# Patient Record
Sex: MALE | Employment: OTHER | URBAN - METROPOLITAN AREA
[De-identification: names, ages, dates, MRNs, and addresses within clinical notes are randomized per-mention and may not be internally consistent; named-entity substitution may affect disease eponyms.]

---

## 2017-03-15 ENCOUNTER — HOSPITAL ENCOUNTER (EMERGENCY)
Facility: CLINIC | Age: 72
Discharge: HOME | End: 2017-03-16
Attending: NURSE PRACTITIONER | Admitting: NURSE PRACTITIONER
Payer: MEDICARE

## 2017-03-15 ENCOUNTER — APPOINTMENT (OUTPATIENT)
Dept: ULTRASOUND IMAGING | Facility: CLINIC | Age: 72
End: 2017-03-15
Attending: EMERGENCY MEDICINE
Payer: MEDICARE

## 2017-03-15 DIAGNOSIS — M79.661 PAIN OF RIGHT LOWER LEG: ICD-10-CM

## 2017-03-15 DIAGNOSIS — T14.8XXA MUSCLE STRAIN: ICD-10-CM

## 2017-03-15 PROCEDURE — 93971 EXTREMITY STUDY: CPT | Mod: RT

## 2017-03-15 PROCEDURE — 99284 EMERGENCY DEPT VISIT MOD MDM: CPT | Mod: 25

## 2017-03-15 ASSESSMENT — ENCOUNTER SYMPTOMS
FEVER: 0
SHORTNESS OF BREATH: 0

## 2017-03-15 NOTE — ED AVS SNAPSHOT
Emergency Department    [address] [place] [address]    Phone:  [phone]    Fax:  [phone]                                       Juan Alberto Schultz   MRN: [id number]    Department:   Emergency Department   Date of Visit:  3/15/2017           After Visit Summary Signature Page     I have received my discharge instructions, and my questions have been answered. I have discussed any challenges I see with this plan with the nurse or doctor.    ..........................................................................................................................................  Patient/Patient Representative Signature      ..........................................................................................................................................  Patient Representative Print Name and Relationship to Patient    ..................................................               ................................................  Date                                            Time    ..........................................................................................................................................  Reviewed by Signature/Title    ...................................................              ..............................................  Date                                                            Time

## 2017-03-15 NOTE — ED AVS SNAPSHOT
Emergency Department    [address] [facility] [address]    Phone:  [phone]    Fax:  [phone]                                       Juan Alberto Schultz   MRN: [id number]    Department:   Emergency Department   Date of Visit:  3/15/2017           Patient Information     Date Of Birth          1945        Your diagnoses for this visit were:     Pain of right lower leg     Muscle strain        You were seen by Veronique Luis APRN CNP.      Follow-up Information     Follow up with Reji Rain MD In 5 days.    Specialty:  Family Practice    Contact information:    [facility]  [address] [facility] [address]  [facility] [address]  [phone]          Discharge Instructions         Muscle Strain in the Extremities  A muscle strain is a stretching and tearing of muscle fibers. This causes pain, especially when you move that muscle. There may also be some swelling and bruising.  Home care    Keep the hurt area raised to reduce pain and swelling. This is especially important during the first 48 hours.    Apply an ice pack over the injured area for 15 to 20 minutes every 3 to 6 hours. You should do this for the first 24 to 48 hours. You can make an ice pack by filling a plastic bag that seals at the top with ice cubes and then wrapping it with a thin towel. Be careful not to injure your skin with the ice treatments. Ice should never be applied directly to skin. Continue the use of ice packs for relief of pain and swelling as needed. After 48 hours, apply heat (warm shower or warm bath) for 15 to 20 minutes several times a day, or alternate ice and heat.    You may use over-the-counter pain medicine to control pain, unless another medicine was prescribed. If you have chronic liver or kidney disease or ever had a stomach ulcer or GI bleeding, talk with your healthcare provider before using these medicines.    For leg strains: If crutches have been recommended, don t put full  weight on the hurt leg until you can do so without pain. You can return to sports when you are able to hop and run on the injured leg without pain.  Follow-up care  Follow up with your healthcare provider, or as advised.  When to seek medical advice  Call your healthcare provider right away if any of these occur:    The toes of the injured leg become swollen, cold, blue, numb, or tingly    Pain or swelling increases    9384-8848 [employer]. [address] [place] [address], [place] [address]. All rights reserved. This information is not intended as a substitute for professional medical care. Always follow your healthcare professional's instructions.          24 Hour Appointment Hotline       To make an appointment at any [facility], call [phone] ([phone]). If you don't have a family doctor or clinic, we will help you find one. [place] clinics are conveniently located to serve the needs of you and your family.             Review of your medicines      Notice     You have not been prescribed any medications.            Procedures and tests performed during your visit     US Lower Extremity Venous Duplex Right      Orders Needing Specimen Collection     None      Pending Results     No orders found for last 3 day(s).            Pending Culture Results     No orders found for last 3 day(s).             Test Results from your hospital stay     3/15/2017 11:06 PM - Interface, Radiant Ib      Narrative     VENOUS DOPPLER RIGHT LOWER EXTREMITY 3/15/2017 10:56 PM    HISTORY: Right lower extremity pain.    COMPARISON: None.    FINDINGS: Color-flow imaging and Doppler waveform spectral analysis  were utilized. There is normal compressibility and spontaneous flow  throughout the right common femoral, superficial femoral, popliteal  and visualized calf veins.        Impression     IMPRESSION: No evidence for deep venous thrombosis.    ZEE MÉNDEZ MD                Clinical Quality Measure: Blood  Pressure Screening     Your blood pressure was checked while you were in the emergency department today. The last reading we obtained was  BP: (!) 176/97 . Please read the guidelines below about what these numbers mean and what you should do about them.  If your systolic blood pressure (the top number) is less than 120 and your diastolic blood pressure (the bottom number) is less than 80, then your blood pressure is normal. There is nothing more that you need to do about it.  If your systolic blood pressure (the top number) is 120-139 or your diastolic blood pressure (the bottom number) is 80-89, your blood pressure may be higher than it should be. You should have your blood pressure rechecked within a year by a primary care provider.  If your systolic blood pressure (the top number) is 140 or greater or your diastolic blood pressure (the bottom number) is 90 or greater, you may have high blood pressure. High blood pressure is treatable, but if left untreated over time it can put you at risk for heart attack, stroke, or kidney failure. You should have your blood pressure rechecked by a primary care provider within the next 4 weeks.  If your provider in the emergency department today gave you specific instructions to follow-up with your doctor or provider even sooner than that, you should follow that instruction and not wait for up to 4 weeks for your follow-up visit.        Thank you for choosing [facility]       Thank you for choosing [facility] for your care. Our goal is always to provide you with excellent care. Hearing back from our patients is one way we can continue to improve our services. Please take a few minutes to complete the written survey that you may receive in the mail after you visit with us. Thank you!        [employer] Information     [employer] lets you send messages to your doctor, view your test results, renew your prescriptions, schedule appointments and more. To sign up, go to [email or web address]  [email or web address] Click on \"Log in\" on the left side of the screen, which will take you to the Welcome page. Then click on \"Sign up Now\" on the right side of the page.     You will be asked to enter the access code listed below, as well as some personal information. Please follow the directions to create your username and password.     Your access code is: [email or web address]  Expires: 2017 11:51 PM     Your access code will  in 90 days. If you need help or a new code, please call your [facility] clinic or [phone].        Care EveryWhere ID     This is your Care EveryWhere ID. This could be used by other organizations to access your [facility] medical records  [id number]        After Visit Summary       This is your record. Keep this with you and show to your community pharmacist(s) and doctor(s) at your next visit.                  "

## 2017-03-16 VITALS
DIASTOLIC BLOOD PRESSURE: 93 MMHG | SYSTOLIC BLOOD PRESSURE: 155 MMHG | HEIGHT: 68 IN | TEMPERATURE: 98.5 F | BODY MASS INDEX: 31.83 KG/M2 | OXYGEN SATURATION: 94 % | WEIGHT: 210 LBS | RESPIRATION RATE: 16 BRPM

## 2017-03-16 NOTE — ED PROVIDER NOTES
"  History     Chief Complaint:  Leg Pain     HPI   Juan Alberto Schultz is a 72 year old male who presents to the emergency department today with his wife for evaluation of right leg pain. The patient reports that he has been having right calf pain since 0700 this morning and this pain is worse with range of motion. The patient initially thought that this was a jacek horse but then his pain did not go away and was getting progressively worse so he came here to the emergency department. He states that his body was \"not letting him walk the way he wanted to walk.\" The patient denies any chest pain, shortness of breath, fever, leg swelling, or trauma to the right leg. The patient states that he has a history of circulation problems. He currently reports that he doesn't feel steady when he is standing; \"like he cannot trust himself.\" The patient's primary care physician is Dr. Reji Rain. The patient states that his brother had a pulmonary embolism several years ago. The patient also notes that he was doing some heavy lifting yesterday.     Allergies:  Antihistamines, Chlorpheniramine-Type [Alkylamines]     Medications:    No current outpatient prescriptions on file.    Past Medical History:    Allergic state  Migraines  Umbilical hernia     Past Surgical History:    Orthopedic surgery     Family History:    Brother: Pulmonary embolism     Social History:  The patient was accompanied to the ED by his wife.  Smoking Status: Never Smoker  Smokeless Tobacco: Never Used  Alcohol Use: Negative   Marital Status:   [2]     Review of Systems   Constitutional: Negative for fever.   Respiratory: Negative for shortness of breath.    Cardiovascular: Negative for chest pain and leg swelling.   Musculoskeletal:        Right leg pain   All other systems reviewed and are negative.    Physical Exam   Vitals:  Patient Vitals for the past 24 hrs:   BP Temp Temp src Heart Rate Resp SpO2 Height Weight   03/15/17 2133 (!) 176/97 98.5 " " F (36.9  C) Temporal 77 16 97 % 1.727 m (5' 8\") 95.3 kg (210 lb)     Physical Exam  Physical Exam   Constitutional: Sitting in bed comfortably. Non toxic appearing.   Head: Head moves freely with normal range of motion.   ENT: Oropharynx is clear and moist.   Eyes: Conjunctivae pink. EOMs intact.   Neck: Normal range of motion.   Cardiovascular: Regular rate and rhythm. Normal heart sounds. No concerning murmur. Intact distal pulses: pedal pulses 2+ on the right, 2+ on the left.   Pulmonary/Chest: No respiratory distress. No decreased breath sounds. No wheezes. No rhonchi. No rales. Lungs clear throughout.   Abdominal: Soft. Non-tender. No rebound, no guarding.   Musculoskeletal: Right lower extremity with obvious varicose veins, especially at the inner thigh/inner calf and around the medial malleolus. No mass or pulsatile mass behind the knee. No generalized edema of the right leg. No erythema or heat to touch. Distal capillary refill and sensation intact.   Neurological: Oriented to person, place, and time. No focal deficits.   Skin: Skin is warm and normal in color. No rash noted.      Emergency Department Course     Imaging:  Radiology findings were communicated with the patient who voiced understanding of the findings.    US Lower Extremity Venous Duplex Right  No evidence for deep venous thrombosis.  ZEE MÉNDEZ MD  Reading per radiology    Emergency Department Course:  Nursing notes and vitals reviewed.  I performed an exam of the patient as documented above.   The patient was sent for a US Lower Extremity Venous Duplex Right while in the emergency department, results above.   I discussed the treatment plan with the patient. They expressed understanding of this plan and consented to discharge. They will be discharged home with instructions for care and follow up. In addition, the patient will return to the emergency department if their symptoms persist, worsen, if new symptoms arise or if there is any " concern.  All questions were answered.  I personally reviewed the imaging results with the patient and answered all related questions prior to discharge.  Impression & Plan      Medical Decision Making:  Pt with right calf pain and cramping. No injury or trauma and initially he denied and new activities but upon discharge he spoke about lifting 100 pound objects yesterday. Right leg with large varicose veins. No concerns on exam for Bakers cyst, popliteal aneurysm, cellulitis, septic joint, myositis and U/S with no DVT. I suspect a muscle strain versus symptomatic varicose veins. WE discussed need for follow up and reasons to return here. Pt amenable to plan.     Diagnosis:    ICD-10-CM    1. Pain of right lower leg M79.661    2. Muscle strain T14.8      Disposition:   The patient is discharged to home.    Scribe Disclosure:  I, Abdon Vale, am serving as a scribe at 11:07 PM on 3/15/2017 to document services personally performed by Veronique Luis, based on my observations and the provider's statements to me.    3/15/2017    EMERGENCY DEPARTMENT       Veronique Luis, APRN CNP  03/16/17 0153

## 2017-03-16 NOTE — ED NOTES
Pt states right leg pain started  About 0700 this morning.  Pt  Has had vascular problems in that leg

## 2017-03-16 NOTE — DISCHARGE INSTRUCTIONS
Muscle Strain in the Extremities  A muscle strain is a stretching and tearing of muscle fibers. This causes pain, especially when you move that muscle. There may also be some swelling and bruising.  Home care    Keep the hurt area raised to reduce pain and swelling. This is especially important during the first 48 hours.    Apply an ice pack over the injured area for 15 to 20 minutes every 3 to 6 hours. You should do this for the first 24 to 48 hours. You can make an ice pack by filling a plastic bag that seals at the top with ice cubes and then wrapping it with a thin towel. Be careful not to injure your skin with the ice treatments. Ice should never be applied directly to skin. Continue the use of ice packs for relief of pain and swelling as needed. After 48 hours, apply heat (warm shower or warm bath) for 15 to 20 minutes several times a day, or alternate ice and heat.    You may use over-the-counter pain medicine to control pain, unless another medicine was prescribed. If you have chronic liver or kidney disease or ever had a stomach ulcer or GI bleeding, talk with your healthcare provider before using these medicines.    For leg strains: If crutches have been recommended, don t put full weight on the hurt leg until you can do so without pain. You can return to sports when you are able to hop and run on the injured leg without pain.  Follow-up care  Follow up with your healthcare provider, or as advised.  When to seek medical advice  Call your healthcare provider right away if any of these occur:    The toes of the injured leg become swollen, cold, blue, numb, or tingly    Pain or swelling increases    [id number] [employer]. [address] [place] [address], [place] [address]. All rights reserved. This information is not intended as a substitute for professional medical care. Always follow your healthcare professional's instructions.

## 2018-10-03 ENCOUNTER — APPOINTMENT (OUTPATIENT)
Dept: CT IMAGING | Facility: CLINIC | Age: 73
End: 2018-10-03
Attending: EMERGENCY MEDICINE
Payer: MEDICARE

## 2018-10-03 ENCOUNTER — HOSPITAL ENCOUNTER (EMERGENCY)
Facility: CLINIC | Age: 73
Discharge: HOME | End: 2018-10-03
Attending: EMERGENCY MEDICINE | Admitting: EMERGENCY MEDICINE
Payer: MEDICARE

## 2018-10-03 ENCOUNTER — APPOINTMENT (OUTPATIENT)
Dept: MRI IMAGING | Facility: CLINIC | Age: 73
End: 2018-10-03
Attending: EMERGENCY MEDICINE
Payer: MEDICARE

## 2018-10-03 VITALS
BODY MASS INDEX: 30.06 KG/M2 | WEIGHT: 210 LBS | OXYGEN SATURATION: 96 % | SYSTOLIC BLOOD PRESSURE: 138 MMHG | DIASTOLIC BLOOD PRESSURE: 84 MMHG | RESPIRATION RATE: 10 BRPM | TEMPERATURE: 98.2 F | HEIGHT: 70 IN

## 2018-10-03 DIAGNOSIS — R29.90 STROKE-LIKE SYMPTOMS: ICD-10-CM

## 2018-10-03 DIAGNOSIS — I10 ESSENTIAL HYPERTENSION: ICD-10-CM

## 2018-10-03 LAB
ANION GAP SERPL CALCULATED.3IONS-SCNC: 6 MMOL/L (ref 3–14)
APTT PPP: 33 SEC (ref 22–37)
BASOPHILS # BLD AUTO: 0 10E9/L (ref 0–0.2)
BASOPHILS NFR BLD AUTO: 0.4 %
BUN SERPL-MCNC: 16 MG/DL (ref 7–30)
CALCIUM SERPL-MCNC: 8.9 MG/DL (ref 8.5–10.1)
CHLORIDE SERPL-SCNC: 109 MMOL/L (ref 94–109)
CO2 SERPL-SCNC: 26 MMOL/L (ref 20–32)
CREAT SERPL-MCNC: 1.03 MG/DL (ref 0.66–1.25)
DIFFERENTIAL METHOD BLD: NORMAL
EOSINOPHIL # BLD AUTO: 0.1 10E9/L (ref 0–0.7)
EOSINOPHIL NFR BLD AUTO: 1.3 %
ERYTHROCYTE [DISTWIDTH] IN BLOOD BY AUTOMATED COUNT: 13.8 % (ref 10–15)
GFR SERPL CREATININE-BSD FRML MDRD: 71 ML/MIN/1.7M2
GLUCOSE SERPL-MCNC: 119 MG/DL (ref 70–99)
HCT VFR BLD AUTO: 45.3 % (ref 40–53)
HGB BLD-MCNC: 15.5 G/DL (ref 13.3–17.7)
IMM GRANULOCYTES # BLD: 0 10E9/L (ref 0–0.4)
IMM GRANULOCYTES NFR BLD: 0.2 %
INR PPP: 1.01 (ref 0.86–1.14)
INTERPRETATION ECG - MUSE: NORMAL
LYMPHOCYTES # BLD AUTO: 1.5 10E9/L (ref 0.8–5.3)
LYMPHOCYTES NFR BLD AUTO: 26.8 %
MCH RBC QN AUTO: 31.1 PG (ref 26.5–33)
MCHC RBC AUTO-ENTMCNC: 34.2 G/DL (ref 31.5–36.5)
MCV RBC AUTO: 91 FL (ref 78–100)
MONOCYTES # BLD AUTO: 0.8 10E9/L (ref 0–1.3)
MONOCYTES NFR BLD AUTO: 13.7 %
NEUTROPHILS # BLD AUTO: 3.2 10E9/L (ref 1.6–8.3)
NEUTROPHILS NFR BLD AUTO: 57.6 %
NRBC # BLD AUTO: 0 10*3/UL
NRBC BLD AUTO-RTO: 0 /100
PLATELET # BLD AUTO: 176 10E9/L (ref 150–450)
POTASSIUM SERPL-SCNC: 4.2 MMOL/L (ref 3.4–5.3)
RBC # BLD AUTO: 4.99 10E12/L (ref 4.4–5.9)
SODIUM SERPL-SCNC: 141 MMOL/L (ref 133–144)
WBC # BLD AUTO: 5.5 10E9/L (ref 4–11)

## 2018-10-03 PROCEDURE — 25000125 ZZHC RX 250: Performed by: EMERGENCY MEDICINE

## 2018-10-03 PROCEDURE — 85730 THROMBOPLASTIN TIME PARTIAL: CPT | Performed by: EMERGENCY MEDICINE

## 2018-10-03 PROCEDURE — 70553 MRI BRAIN STEM W/O & W/DYE: CPT

## 2018-10-03 PROCEDURE — 25500064 ZZH RX 255 OP 636: Performed by: EMERGENCY MEDICINE

## 2018-10-03 PROCEDURE — 93005 ELECTROCARDIOGRAM TRACING: CPT

## 2018-10-03 PROCEDURE — A9585 GADOBUTROL INJECTION: HCPCS | Performed by: EMERGENCY MEDICINE

## 2018-10-03 PROCEDURE — 85610 PROTHROMBIN TIME: CPT | Performed by: EMERGENCY MEDICINE

## 2018-10-03 PROCEDURE — 85025 COMPLETE CBC W/AUTO DIFF WBC: CPT | Performed by: EMERGENCY MEDICINE

## 2018-10-03 PROCEDURE — 99285 EMERGENCY DEPT VISIT HI MDM: CPT | Mod: 25

## 2018-10-03 PROCEDURE — 80048 BASIC METABOLIC PNL TOTAL CA: CPT | Performed by: EMERGENCY MEDICINE

## 2018-10-03 PROCEDURE — 70496 CT ANGIOGRAPHY HEAD: CPT | Mod: XS

## 2018-10-03 PROCEDURE — 70496 CT ANGIOGRAPHY HEAD: CPT

## 2018-10-03 PROCEDURE — 70450 CT HEAD/BRAIN W/O DYE: CPT

## 2018-10-03 PROCEDURE — 25000128 H RX IP 250 OP 636: Performed by: EMERGENCY MEDICINE

## 2018-10-03 PROCEDURE — 99214 OFFICE O/P EST MOD 30 MIN: CPT | Performed by: PSYCHIATRY & NEUROLOGY

## 2018-10-03 RX ORDER — IOPAMIDOL 755 MG/ML
125 INJECTION, SOLUTION INTRAVASCULAR ONCE
Status: COMPLETED | OUTPATIENT
Start: 2018-10-03 | End: 2018-10-03

## 2018-10-03 RX ORDER — GADOBUTROL 604.72 MG/ML
9 INJECTION INTRAVENOUS ONCE
Status: COMPLETED | OUTPATIENT
Start: 2018-10-03 | End: 2018-10-03

## 2018-10-03 RX ADMIN — GADOBUTROL 9 ML: 604.72 INJECTION INTRAVENOUS at 15:53

## 2018-10-03 RX ADMIN — IOPAMIDOL 125 ML: 755 INJECTION, SOLUTION INTRAVENOUS at 14:19

## 2018-10-03 RX ADMIN — SODIUM CHLORIDE, PRESERVATIVE FREE 100 ML: 5 INJECTION INTRAVENOUS at 14:19

## 2018-10-03 ASSESSMENT — ENCOUNTER SYMPTOMS
NUMBNESS: 1
DIAPHORESIS: 1
EYE DISCHARGE: 1
WEAKNESS: 1
SPEECH DIFFICULTY: 1

## 2018-10-03 NOTE — ED PROVIDER NOTES
"  History     Chief Complaint:  One-sided Weakness     The history is provided by the patient.      Juan Alberot Schultz is a 73 year old male with an unknown history, who presents with his wife for evaluation of left sided weakness. About 12 minutes prior to arrival, the patient reports that he was driving home after visiting his father-in-law's cemetary with family, when he began to feel \"shaky and unsure of himself.\" He refused to drive at this time due to this, and a few minutes later began to tear up in his left eye and develop left sided numbness radiating from his left hand into his left armpit. In addition to this the patient endorses slurred speech, trouble finding words and voice changes which he describes as sounding \"choked\" up, although he notes that he was not particularly emotional at this time. Since onset, these symptoms have been intermittent. He presents hypertensive at 102/108 in the ED with a HR of 85 and stats at 97%. The patient additionally endorses diaphoresis from his scalp as well as a sense of being off balance, yet he states this could be due to a current knee injury. No other symptoms at this time.     Allergies:  Antihistamines, Chlorpheniramine-Type [Alkylamines]  Valium [Diazepam]    Medications:    The patient is not currently taking any prescribed medications.    Past Medical History:    Migraines    Past Surgical History:    Orthopedic surgery    Family History:    History reviewed. No pertinent family history.     Social History:  The patient was accompanied to the ED by his wife.  Smoking Status: Never  Smokeless Tobacco: Never  Alcohol Use: No  Marital Status:       Review of Systems   Constitutional: Positive for diaphoresis.   Eyes: Positive for discharge.   Neurological: Positive for speech difficulty, weakness and numbness.   All other systems reviewed and are negative.    Physical Exam     Physical Exam    Patient Vitals for the past 24 hrs:   BP Temp Temp src Heart Rate " "Resp SpO2 Height Weight   10/03/18 1557 - - - 79 10 96 % - -   10/03/18 1556 138/84 - - - - - - -   10/03/18 1500 128/83 - - 72 22 92 % - -   10/03/18 1445 137/88 - - 83 14 - - -   10/03/18 1433 146/88 - - 81 20 95 % - -   10/03/18 1426 - - - 81 13 100 % - -   10/03/18 1425 (!) 154/97 - - 85 13 - - -   10/03/18 1420 144/85 - - - - - - -   10/03/18 1415 153/90 - - 85 16 93 % - -   10/03/18 1354 (!) 183/99 98.2  F (36.8  C) Oral 91 18 95 % 1.765 m (5' 9.5\") 95.3 kg (210 lb)     General: Alert and Interactive.   Head: No signs of trauma.   Mouth/Throat: Oropharynx is clear and moist.   Eyes: Conjunctivae are normal. Pupils are equal, round, and reactive to light.   Neck: Normal range of motion. No nuchal rigidity.   CV: Normal rate and regular rhythm.    Resp: Effort normal and breath sounds normal. No respiratory distress.   GI: Soft. There is no tenderness or guarding.   MSK: Normal range of motion. no edema.   Neuro Exam:  PERRLA, EOMI, visual fields intact  strength in upper/lower extremities normal and symetrical.  No drift.  DTR's normal.  Sensation normal.  Finger nose finger, rapid alternating movements normal   Rhomberg negative  Speech normal  Gait normal  GCS 15  Skin: Skin is warm and dry. No rash noted.   Psych: normal mood and affect. behavior is normal.     Emergency Department Course     ECG (14:05:03):  Rate 84 bpm. AZ interval 164. QRS duration 90. QT/QTc 352/415. P-R-T axes 43 34 34. Normal sinus rhythm. Possible left artrial enlargement. Borderline ECG. Interpreted at 1406 by Germán James MD.    Imaging:  Radiology findings were communicated with the patient who voiced understanding of the findings.    CT Head Perfusion w Contrast:  Normal CT perfusion of the brain.      Radiation dose for this scan was reduced using automated exposure  control, adjustment of the mA and/or kV according to patient size, or  iterative reconstruction technique.    CHUCK ALARCON MD    CT Head w/o " Contrast:  Diffuse cerebral volume loss and cerebral white matter  changes consistent with chronic small vessel ischemic disease. No  evidence for acute intracranial pathology.        Radiation dose for this scan was reduced using automated exposure  control, adjustment of the mA and/or kV according to patient size, or  iterative reconstruction technique.    CHUCK ALARCON MD    CTA Head and Neck w Contrast:  Calcified atherosclerotic plaque of the intracranial  distal internal carotid arteries on both sides that does not result in  stenosis on either side. Otherwise, normal neck and head CTA.      Radiation dose for this scan was reduced using automated exposure  control, adjustment of the mA and/or kV according to patient size, or  iterative reconstruction technique.    CHUCK ALARCON MD    MR Brain w & w/o Contrast:  No evidence of acute ischemia or hemorrhage. Volume loss  and white matter T2 hyperintensities which likely represent chronic  small vessel ischemic change.    JAMI VALDEZ MD    Laboratory:  Laboratory findings were communicated with the patient who voiced understanding of the findings.    CBC: WNL (WBC 5.5, HGB 15.5, )  BMP: Glucose 119 (H), o/w WNL (Creatinine 1.03)    INR: 1.01  Partial thromboplastin: 33    Interventions:  1419 - Isovue 125 mL IV  1553 - Gadavist injection 9 mL IV    Emergency Department Course:  Nursing notes and vitals reviewed.    1400: I performed an exam of the patient as documented above.   1406: Code stroke activated  1407: Initial exam completed, I exited the room at this time.     Findings and plan explained to the Patient. Patient discharged home with instructions regarding supportive care, medications, and reasons to return. The importance of close follow-up was reviewed.     Impression & Plan      Medical Decision Making:  Juan Alberto Schultz is a 73 year old male who presents for evaluation of stroke like symptoms.      This is consistent with possible transient  ischemic attack.     Detailed neurologic exam here in ED shows complete resolution of symptoms.  Imaging as noted above.  Based on symptom resolution and NIH stroke scale, they are not a candidate for TPA, neurology team agrees.     I did discuss this with the patient and his wife. hey express understanding. Differential considered for symptoms included CVA, TIA, dissection, cerebral tumor, migraine, infection, metabolic derangement, demyelination, etc.  EKG shows no atrial fibrillation nor arrhythmia noted on telemetry monitoring.  Neuro recommends outpatient followup now that his symptoms have resolved and imaging is negative.        Diagnosis:    ICD-10-CM    1. Stroke-like symptoms R29.90    2. Essential hypertension I10        Disposition:  discharged to home    Ama Kellogg  10/3/2018    EMERGENCY DEPARTMENT  I, Ama Kellogg, am serving as a scribe at 2:00 PM on 10/3/2018 to document services personally performed by Germán James MD based on my observations and the provider's statements to me.         Germán James MD  10/03/18 1737

## 2018-10-03 NOTE — CONSULTS
"[facility]     Neurology Stroke Consult    Patient Name: Juan Alberto Schultz  : 1945   MRN#: [id number]    STROKE DATA  Stroke Code:  Time called:  10/03/2018 1406  Time patient seen:  10/03/2018 1410  Onset of symptoms:  10/03/2018 1340  Last known normal (pt's baseline):  10/03/2018 1340  Head CT read by Dr. Montoya at:  10/03/2018 1416  Stroke Code de-escalated at 10/03/2018 1430 after discussion with Dr. LYNDA Montoya due to resolution of symptoms.   TPA treatment:  Not given due to minor / isolated / quickly resolving stroke symptoms.   National Institutes of Health Stroke Scale (at presentation)  NIHSS done at:  time patient seen     Score    Level of consciousness:  (0)   Alert, keenly responsive     LOC questions:  (0)   Answers both questions correctly    LOC commands:  (0)   Performs both tasks correctly    Best gaze:  (0)   Normal    Visual:  (0)   No visual loss    Facial palsy:  (0)   Normal symmetrical movements    Motor arm (left):  (0)   No drift    Motor arm (right):  (0)   No drift    Motor leg (left):  (0)   No drift    Motor leg (right):  (0)   No drift    Limb ataxia:  (0)   Absent    Sensory:  (0)   Normal- no sensory loss    Best language:  (0)   Normal- no aphasia    Dysarthria:  (0)   Normal    Extinction and inattention:  (0)   No abnormality        NIHSS Total Score:  0        Chief complaint  Left arm numbness    HPI  Juan Alberto Schultz is a 73 year old right handed male with Asperger's who presented with numbness that began in his left hand, extended throughout arm and to the left side of his face today. He was returning home from his father-in-law's  when he began to feel \"off\" and asked his wife to drive. He felt that his left hand was numb and tingling, first in his 4th and 5th fingers, then spreading up his arm. This occurred within 30 minutes of presentation. His face also felt numb and his wife reports he was mumbling, but mostly making sense. He may have had some " weakness in his left hand. On arrival to the ED his blood pressure was near 200 systolic, but came down to 140/90. He denies history of hypertension but does not go to the doctor. After CT, he reports that numbness/tingling are limited to his 5th finger, ulnar side of left hand.     Pertinent Past Medical/Surgical History  Past Medical History:   Diagnosis Date     Allergic state      Migraines      Umbilical hernia      Past Surgical History:   Procedure Laterality Date     ORTHOPEDIC SURGERY     Medications: I have reviewed this patient's current medications.  No daily medications  Allergies: All allergies reviewed and addressed.  Family History: No family history on file..  Social History: I have reviewed this patient's social history. Lives with his wife. 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  Tobacco use: Never    ROS:  The 10 point Review of Systems is negative other than noted in the HPI or here.     PHYSICAL EXAMINATION  Vital Signs:  B/P: 128/83,  T: 98.2,  P: Data Unavailable,  R: 22  General:  patient lying in bed without any acute distress    HEENT:  normocephalic/atraumatic  Cardio:  RRR  Pulmonary:  no respiratory distress  Abdomen:  non-distended  Extremities:  no edema  Skin:  intact   Neurologic  Mental Status:  fully alert, attentive and oriented, follows commands, speech clear and fluent  Cranial Nerves:  visual fields intact, PERRL, EOMI with normal smooth pursuit although corrective saccades, facial sensation intact and symmetric, facial movements symmetric, hearing not formally tested but intact to conversation, palate elevation symmetric and uvula midline, no dysarthria, tongue protrusion midline  Motor:  no abnormal movements, normal tone throughout, normal muscle bulk, no pronator drift, strength 5/5 throughout upper and lower extremities  Reflexes:  not tested  Sensory:  intact/symmetric to light touch and pin prick throughout upper and lower  extremities  Coordination:  FNF and HS intact without dysmetria  Station/Gait:  not tested    Labs  Labs and Imaging reviewed and used in developing the plan; pertinent results included.   Lab Results   Component Value Date     (H) 10/03/2018   PTT 33, INR 1.01  Hgb 15.5, WBC 5.5, Plt 176  BMP wnl    Imaging     Cta Head Neck With Contrast - Calcified atherosclerotic plaque of the intracranial distal internal carotid arteries on both sides that does not result in stenosis on either side. Otherwise, normal neck and head CTA.    Ct Head W/o Contrast - Diffuse cerebral volume loss and cerebral white matter changes consistent with chronic small vessel ischemic disease. No evidence for acute intracranial pathology.     Ct Head Perfusion W Contrast - Normal CT perfusion of the brain.     MRI - No evidence of acute ischemia or hemorrhage. Volume loss and white matter T2 hyperintensities which likely represent chronic small vessel ischemic change.    Dysphagia Screen  Not performed by the Stroke team     ASSESSMENT & RECOMMENDATIONS     Impression:   The patient was seen for left arm, face numbness.  The differential diagnosis includes hypertensive encephalopathy. Less likely to be TIA. With near complete resolution of symptoms and no evidence of acute ischemia on CT, CT perfusion, CTA, MRI discussed that his symptoms are not likely due to TIA or stroke and TPA is not indicated.     Recommendations:  - Ok to discharge  - Close follow up with PCP to monitor/control blood pressure   - Will need further secondary prevention, counseled regarding close follow-up with his PCP  - Counseled patient on presenting to the ER if he develops any acute strokelike symptoms    The patient was discussed with Dr. Montoya.    Scribed by:  Marsha Foote, MS4    Lisa López MD  Vascular Neurology fellow  Pager # [phone]

## 2018-10-03 NOTE — ED AVS SNAPSHOT
Emergency Department    [address] [place] [address]    Phone:  [phone]    Fax:  [phone]                                       Juan Alberto Schultz   MRN: [id number]    Department:   Emergency Department   Date of Visit:  10/3/2018           After Visit Summary Signature Page     I have received my discharge instructions, and my questions have been answered. I have discussed any challenges I see with this plan with the nurse or doctor.    ..........................................................................................................................................  Patient/Patient Representative Signature      ..........................................................................................................................................  Patient Representative Print Name and Relationship to Patient    ..................................................               ................................................  Date                                   Time    ..........................................................................................................................................  Reviewed by Signature/Title    ...................................................              ..............................................  Date                                               Time          22EPIC Rev 08/18

## 2018-10-03 NOTE — DISCHARGE INSTRUCTIONS
What Is a TIA?  A TIA (transient ischemic attack) is an early warning that a stroke (also called a brain attack) is coming. A TIA is a temporary stroke. It causes no lasting damage. But the effects of a stroke, if it happens, can be very serious and lasting. If you think you are having symptoms of a TIA or stroke--even if they don t last--get medical help right away.     If you have any symptoms of a TIA or stroke, call 911 and your doctor as soon as possible.     Symptoms of TIA and stroke  Symptoms may come on suddenly and last for a few seconds or a few hours. You may have symptoms only once. Or they may come and go for days. If you notice any of the following symptoms, don t wait. Call 911 or emergency services right away.    Weakness, numbness, tingling, or loss of feeling in your face, arm, or leg    Trouble seeing in one or both eyes; double vision    Slurred speech, trouble talking, or problems understanding others when they speak    Sudden, severe headache    Dizziness or a feeling of spinning    Loss of balance or falling    Blackouts  F.A.S.T. is an easy way to remember the signs of a stroke. When you see the signs, you will know what you need to call 911 fast.   F.A.S.T. stands for:     F is for face drooping. One side of the face is drooping or numb. When the person smiles, the smile is uneven.    A is for arm weakness. One arm is weak or numb. When the person lifts both arms and the same time, one arm may drift downward.    S is for speech difficulty. You may notice slurred speech or trouble speaking. The person can't repeat a simple sentence correctly when asked.    T is for time to call 911. If someone shows any of these symptoms, even if they go away, call 911 right away. Make note of the time the symptoms first appeared.  Date Last Reviewed: 7/1/2017 2000-2017 [employer]. [address] [place] [address], [place] [address]. All rights reserved. This information is not intended as a  substitute for professional medical care. Always follow your healthcare professional's instructions.

## 2018-10-03 NOTE — ED AVS SNAPSHOT
Emergency Department    [address] [facility] [address]    Phone:  [phone]    Fax:  [phone]                                       Juan Alberto Schultz   MRN: [id number]    Department:   Emergency Department   Date of Visit:  10/3/2018           Patient Information     Date Of Birth          1945        Your diagnoses for this visit were:     Stroke-like symptoms     Essential hypertension        You were seen by Germán James MD.      Follow-up Information     Follow up with Reji Rain MD In 1 day.    Specialty:  Family Practice    Contact information:    [address] [facility] [address]  [facility] [address]  [phone]          Discharge Instructions         What Is a TIA?  A TIA (transient ischemic attack) is an early warning that a stroke (also called a brain attack) is coming. A TIA is a temporary stroke. It causes no lasting damage. But the effects of a stroke, if it happens, can be very serious and lasting. If you think you are having symptoms of a TIA or stroke--even if they don t last--get medical help right away.     If you have any symptoms of a TIA or stroke, call 911 and your doctor as soon as possible.     Symptoms of TIA and stroke  Symptoms may come on suddenly and last for a few seconds or a few hours. You may have symptoms only once. Or they may come and go for days. If you notice any of the following symptoms, don t wait. Call 911 or emergency services right away.    Weakness, numbness, tingling, or loss of feeling in your face, arm, or leg    Trouble seeing in one or both eyes; double vision    Slurred speech, trouble talking, or problems understanding others when they speak    Sudden, severe headache    Dizziness or a feeling of spinning    Loss of balance or falling    Blackouts  F.A.S.T. is an easy way to remember the signs of a stroke. When you see the signs, you will know what you need to call 911 fast.   F.A.S.T. stands for:     F is for face drooping. One side  of the face is drooping or numb. When the person smiles, the smile is uneven.    A is for arm weakness. One arm is weak or numb. When the person lifts both arms and the same time, one arm may drift downward.    S is for speech difficulty. You may notice slurred speech or trouble speaking. The person can't repeat a simple sentence correctly when asked.    T is for time to call 911. If someone shows any of these symptoms, even if they go away, call 911 right away. Make note of the time the symptoms first appeared.  Date Last Reviewed: 7/1/2017 2000-2017 [employer]. [address] [place] [address], [place] [address]. All rights reserved. This information is not intended as a substitute for professional medical care. Always follow your healthcare professional's instructions.          24 Hour Appointment Hotline       To make an appointment at any [facility], call [phone] ([phone]). If you don't have a family doctor or clinic, we will help you find one. [facility] are conveniently located to serve the needs of you and your family.             Review of your medicines      Notice     You have not been prescribed any medications.            Procedures and tests performed during your visit     Basic metabolic panel    CBC with platelets differential    CT Head Perfusion w Contrast    CT Head w/o Contrast    CTA Head Neck with Contrast    EKG 12 lead    INR    MR Brain w/o & w Contrast    Partial thromboplastin time      Orders Needing Specimen Collection     None      Pending Results     No orders found from 10/1/2018 to 10/4/2018.            Pending Culture Results     No orders found from 10/1/2018 to 10/4/2018.            Pending Results Instructions     If you had any lab results that were not finalized at the time of your Discharge, you can call the ED Lab Result RN at [phone]. You will be contacted by this team for any positive Lab results or changes in treatment. The nurses are  available 7 days a week from 10A to 6:30P.  You can leave a message 24 hours per day and they will return your call.        Test Results From Your Hospital Stay        10/3/2018  2:31 PM      Narrative     CT BRAIN PERFUSION 10/3/2018 2:26 PM    COMPARISON: None.    HISTORY: Code Stroke.    TECHNIQUE: Time sequential axial CT images of the head were acquired  during the administration of intravenous contrast (50mL Isovue-370).  CTA images of the [place] as well as color perfusion maps of  the brain were created from this time sequential axial source data.    FINDINGS: There are no focal or regional perfusion defects in the  brain.        Impression     IMPRESSION: Normal CT perfusion of the brain.      Radiation dose for this scan was reduced using automated exposure  control, adjustment of the mA and/or kV according to patient size, or  iterative reconstruction technique.    CHUCK ALARCON MD         10/3/2018  2:31 PM      Narrative     CT OF THE HEAD WITHOUT CONTRAST 10/3/2018 2:16 PM     COMPARISON: Head CT 11/23/2014    HISTORY: Code Stroke.     TECHNIQUE: 5 mm thick axial CT images of the head were acquired  without IV contrast material.    FINDINGS: There is mild diffuse cerebral volume loss. There are subtle  patchy areas of decreased density in the cerebral white matter  bilaterally that are consistent with sequela of chronic small vessel  ischemic disease.    The ventricles and basal cisterns are within normal limits in  configuration given the degree of cerebral volume loss. There is no  midline shift. There are no extra-axial fluid collections.    No intracranial hemorrhage, mass or recent infarct.    The visualized paranasal sinuses are well-aerated. There is no  mastoiditis. There are no fractures of the visualized bones.        Impression     IMPRESSION: Diffuse cerebral volume loss and cerebral white matter  changes consistent with chronic small vessel ischemic disease. No  evidence for  acute intracranial pathology.        Radiation dose for this scan was reduced using automated exposure  control, adjustment of the mA and/or kV according to patient size, or  iterative reconstruction technique.    CHUCK ALARCON MD         10/3/2018  2:36 PM      Narrative     CT ANGIOGRAM OF THE HEAD AND NECK WITHOUT AND WITH CONTRAST  10/3/2018  2:23 PM     COMPARISON: None    HISTORY: Code Stroke.     TECHNIQUE: Precontrast localizing scans were followed by CT  angiography with an injection of 70 mL Isovue-370 nonionic intravenous  contrast material with scans through the head and neck. Images were  transferred to a separate 3-D workstation where multiplanar  reformations and 3-D images were created. Estimates of carotid  stenoses are made relative to the distal internal carotid artery  diameters except as noted.      FINDINGS:   Neck CTA: The common carotid arteries bilaterally are patent without  stenosis. The cervical internal carotid arteries bilaterally are  tortuous but are patent without stenosis. The dominant right and tiny  left vertebral arteries are patent and unremarkable.    Head CTA: There is calcified atherosclerotic plaque of the  intracranial distal internal carotid arteries on both sides that does  not result in stenosis on either side. The basilar, bilateral anterior  cerebral, bilateral middle cerebral and bilateral posterior cerebral  arteries are patent and unremarkable. The anterior communicating  artery is patent and unremarkable.        Impression     IMPRESSION: Calcified atherosclerotic plaque of the intracranial  distal internal carotid arteries on both sides that does not result in  stenosis on either side. Otherwise, normal neck and head CTA.      Radiation dose for this scan was reduced using automated exposure  control, adjustment of the mA and/or kV according to patient size, or  iterative reconstruction technique.    CHUCK ALARCON MD         10/3/2018  2:28 PM      Component  Results     Component Value Ref Range & Units Status    Sodium 141 133 - 144 mmol/L Final    Potassium 4.2 3.4 - 5.3 mmol/L Final    Chloride 109 94 - 109 mmol/L Final    Carbon Dioxide 26 20 - 32 mmol/L Final    Anion Gap 6 3 - 14 mmol/L Final    Glucose 119 (H) 70 - 99 mg/dL Final    Urea Nitrogen 16 7 - 30 mg/dL Final    Creatinine 1.03 0.66 - 1.25 mg/dL Final    GFR Estimate 71 >60 mL/min/1.7m2 Final    Non  GFR Calc    GFR Estimate If Black 86 >60 mL/min/1.7m2 Final    African American GFR Calc    Calcium 8.9 8.5 - 10.1 mg/dL Final         10/3/2018  2:14 PM      Component Results     Component Value Ref Range & Units Status    WBC 5.5 4.0 - 11.0 10e9/L Final    RBC Count 4.99 4.4 - 5.9 10e12/L Final    Hemoglobin 15.5 13.3 - 17.7 g/dL Final    Hematocrit 45.3 40.0 - 53.0 % Final    MCV 91 78 - 100 fl Final    MCH 31.1 26.5 - 33.0 pg Final    MCHC 34.2 31.5 - 36.5 g/dL Final    RDW 13.8 10.0 - 15.0 % Final    Platelet Count 176 150 - 450 10e9/L Final    Diff Method Automated Method  Final    % Neutrophils 57.6 % Final    % Lymphocytes 26.8 % Final    % Monocytes 13.7 % Final    % Eosinophils 1.3 % Final    % Basophils 0.4 % Final    % Immature Granulocytes 0.2 % Final    Nucleated RBCs 0 0 /100 Final    Absolute Neutrophil 3.2 1.6 - 8.3 10e9/L Final    Absolute Lymphocytes 1.5 0.8 - 5.3 10e9/L Final    Absolute Monocytes 0.8 0.0 - 1.3 10e9/L Final    Absolute Eosinophils 0.1 0.0 - 0.7 10e9/L Final    Absolute Basophils 0.0 0.0 - 0.2 10e9/L Final    Abs Immature Granulocytes 0.0 0 - 0.4 10e9/L Final    Absolute Nucleated RBC 0.0  Final         10/3/2018  2:27 PM      Component Results     Component Value Ref Range & Units Status    INR 1.01 0.86 - 1.14 Final         10/3/2018  2:27 PM      Component Results     Component Value Ref Range & Units Status    PTT 33 22 - 37 sec Final         10/3/2018  4:07 PM      Narrative     MRI BRAIN WITHOUT AND WITH CONTRAST  10/3/2018 3:53 PM    HISTORY:  Int  stroke symptoms, negative CTs.      TECHNIQUE:  Multiplanar, multisequence MRI of the brain without and  with 9 mL Gadavist.    COMPARISON: Same day head CT.    FINDINGS:  Mild volume loss is present. Periventricular and  subcortical T2 FLAIR hyperintensities likely represent chronic small  vessel ischemic change, advanced for age. No evidence of acute  ischemia, hemorrhage, mass, mass effect, or hydrocephalus. No abnormal  enhancement or diffusion restriction is identified. Visualized  calvarium, skull base, paranasal sinuses, and extracranial soft  tissues are unremarkable. Trace opacification in the left mastoid  cavity is likely inflammatory.        Impression     IMPRESSION:  No evidence of acute ischemia or hemorrhage. Volume loss  and white matter T2 hyperintensities which likely represent chronic  small vessel ischemic change.    JAMI VALDEZ MD                Clinical Quality Measure: Blood Pressure Screening     Your blood pressure was checked while you were in the emergency department today. The last reading we obtained was  BP: 128/83 . Please read the guidelines below about what these numbers mean and what you should do about them.  If your systolic blood pressure (the top number) is less than 120 and your diastolic blood pressure (the bottom number) is less than 80, then your blood pressure is normal. There is nothing more that you need to do about it.  If your systolic blood pressure (the top number) is 120-139 or your diastolic blood pressure (the bottom number) is 80-89, your blood pressure may be higher than it should be. You should have your blood pressure rechecked within a year by a primary care provider.  If your systolic blood pressure (the top number) is 140 or greater or your diastolic blood pressure (the bottom number) is 90 or greater, you may have high blood pressure. High blood pressure is treatable, but if left untreated over time it can put you at risk for heart attack, stroke, or  "kidney failure. You should have your blood pressure rechecked by a primary care provider within the next 4 weeks.  If your provider in the emergency department today gave you specific instructions to follow-up with your doctor or provider even sooner than that, you should follow that instruction and not wait for up to 4 weeks for your follow-up visit.        Thank you for choosing [facility]       Thank you for choosing [facility] for your care. Our goal is always to provide you with excellent care. Hearing back from our patients is one way we can continue to improve our services. Please take a few minutes to complete the written survey that you may receive in the mail after you visit with us. Thank you!        [employer] TEAM Information     [employer] lets you send messages to your doctor, view your test results, renew your prescriptions, schedule appointments and more. To sign up, go to [email or web address] . Click on \"Log in\" on the left side of the screen, which will take you to the Welcome page. Then click on \"Sign up Now\" on the right side of the page.     You will be asked to enter the access code listed below, as well as some personal information. Please follow the directions to create your username and password.     Your access code is: [email or web address]  Expires: 2019  4:31 PM     Your access code will  in 90 days. If you need help or a new code, please call your [facility] clinic or [phone].        Care EveryWhere ID     This is your Care EveryWhere ID. This could be used by other organizations to access your [facility] medical records  [id number]        Equal Access to Services     [facility] : Hadii ken jesuso Sonaz, waaxda luqadaha, qaybta kaalmada ademikaylayakofi, paula mehta . So [facility] [phone].    ATENCIÓN: Si habla español, tiene a headley disposición servicios gratuitos de asistencia lingüística. Llame al [phone].    We comply with applicable federal civil rights " laws and [place] laws. We do not discriminate on the basis of race, color, national origin, age, disability, sex, sexual orientation, or gender identity.            After Visit Summary       This is your record. Keep this with you and show to your community pharmacist(s) and doctor(s) at your next visit.

## 2019-07-27 ENCOUNTER — APPOINTMENT (OUTPATIENT)
Dept: GENERAL RADIOLOGY | Facility: CLINIC | Age: 74
End: 2019-07-27
Attending: EMERGENCY MEDICINE
Payer: MEDICARE

## 2019-07-27 ENCOUNTER — HOSPITAL ENCOUNTER (EMERGENCY)
Facility: CLINIC | Age: 74
Discharge: HOME | End: 2019-07-27
Attending: EMERGENCY MEDICINE | Admitting: EMERGENCY MEDICINE
Payer: MEDICARE

## 2019-07-27 VITALS
HEIGHT: 70 IN | RESPIRATION RATE: 17 BRPM | TEMPERATURE: 97.9 F | BODY MASS INDEX: 31.64 KG/M2 | SYSTOLIC BLOOD PRESSURE: 148 MMHG | WEIGHT: 221 LBS | DIASTOLIC BLOOD PRESSURE: 82 MMHG | OXYGEN SATURATION: 95 %

## 2019-07-27 DIAGNOSIS — R60.9 EDEMA, UNSPECIFIED TYPE: ICD-10-CM

## 2019-07-27 LAB
ALBUMIN SERPL-MCNC: 3.9 G/DL (ref 3.4–5)
ALP SERPL-CCNC: 108 U/L (ref 40–150)
ALT SERPL W P-5'-P-CCNC: 42 U/L (ref 0–70)
ANION GAP SERPL CALCULATED.3IONS-SCNC: 5 MMOL/L (ref 3–14)
AST SERPL W P-5'-P-CCNC: 32 U/L (ref 0–45)
BASOPHILS # BLD AUTO: 0 10E9/L (ref 0–0.2)
BASOPHILS NFR BLD AUTO: 0.4 %
BILIRUB SERPL-MCNC: 0.6 MG/DL (ref 0.2–1.3)
BUN SERPL-MCNC: 15 MG/DL (ref 7–30)
CALCIUM SERPL-MCNC: 8.8 MG/DL (ref 8.5–10.1)
CHLORIDE SERPL-SCNC: 109 MMOL/L (ref 94–109)
CO2 SERPL-SCNC: 27 MMOL/L (ref 20–32)
CREAT SERPL-MCNC: 0.82 MG/DL (ref 0.66–1.25)
DIFFERENTIAL METHOD BLD: NORMAL
EOSINOPHIL # BLD AUTO: 0.1 10E9/L (ref 0–0.7)
EOSINOPHIL NFR BLD AUTO: 2.8 %
ERYTHROCYTE [DISTWIDTH] IN BLOOD BY AUTOMATED COUNT: 14 % (ref 10–15)
GFR SERPL CREATININE-BSD FRML MDRD: 88 ML/MIN/{1.73_M2}
GLUCOSE SERPL-MCNC: 113 MG/DL (ref 70–99)
HCT VFR BLD AUTO: 42.4 % (ref 40–53)
HGB BLD-MCNC: 14.7 G/DL (ref 13.3–17.7)
IMM GRANULOCYTES # BLD: 0 10E9/L (ref 0–0.4)
IMM GRANULOCYTES NFR BLD: 0 %
LYMPHOCYTES # BLD AUTO: 2 10E9/L (ref 0.8–5.3)
LYMPHOCYTES NFR BLD AUTO: 40.1 %
MCH RBC QN AUTO: 31.6 PG (ref 26.5–33)
MCHC RBC AUTO-ENTMCNC: 34.7 G/DL (ref 31.5–36.5)
MCV RBC AUTO: 91 FL (ref 78–100)
MONOCYTES # BLD AUTO: 0.6 10E9/L (ref 0–1.3)
MONOCYTES NFR BLD AUTO: 11.5 %
NEUTROPHILS # BLD AUTO: 2.2 10E9/L (ref 1.6–8.3)
NEUTROPHILS NFR BLD AUTO: 45.2 %
NRBC # BLD AUTO: 0 10*3/UL
NRBC BLD AUTO-RTO: 0 /100
NT-PROBNP SERPL-MCNC: 94 PG/ML (ref 0–900)
PLATELET # BLD AUTO: 181 10E9/L (ref 150–450)
POTASSIUM SERPL-SCNC: 3.9 MMOL/L (ref 3.4–5.3)
PROT SERPL-MCNC: 7.4 G/DL (ref 6.8–8.8)
RBC # BLD AUTO: 4.65 10E12/L (ref 4.4–5.9)
SODIUM SERPL-SCNC: 141 MMOL/L (ref 133–144)
TROPONIN I SERPL-MCNC: <0.015 UG/L (ref 0–0.04)
WBC # BLD AUTO: 5 10E9/L (ref 4–11)

## 2019-07-27 PROCEDURE — 93005 ELECTROCARDIOGRAM TRACING: CPT

## 2019-07-27 PROCEDURE — 80053 COMPREHEN METABOLIC PANEL: CPT | Performed by: EMERGENCY MEDICINE

## 2019-07-27 PROCEDURE — 83880 ASSAY OF NATRIURETIC PEPTIDE: CPT | Performed by: EMERGENCY MEDICINE

## 2019-07-27 PROCEDURE — 85025 COMPLETE CBC W/AUTO DIFF WBC: CPT | Performed by: EMERGENCY MEDICINE

## 2019-07-27 PROCEDURE — 71046 X-RAY EXAM CHEST 2 VIEWS: CPT

## 2019-07-27 PROCEDURE — 99285 EMERGENCY DEPT VISIT HI MDM: CPT | Mod: 25

## 2019-07-27 PROCEDURE — 84484 ASSAY OF TROPONIN QUANT: CPT | Performed by: EMERGENCY MEDICINE

## 2019-07-27 RX ORDER — ATORVASTATIN CALCIUM 40 MG/1
40 TABLET, FILM COATED ORAL EVERY EVENING
COMMUNITY

## 2019-07-27 RX ORDER — ASPIRIN 81 MG/1
81 TABLET ORAL EVERY EVENING
Status: ON HOLD | COMMUNITY
End: 2023-12-20

## 2019-07-27 RX ORDER — TRIAMTERENE AND HYDROCHLOROTHIAZIDE 37.5; 25 MG/1; MG/1
1 CAPSULE ORAL EVERY MORNING
Qty: 10 CAPSULE | Refills: 0 | Status: SHIPPED | OUTPATIENT
Start: 2019-07-27 | End: 2019-10-04

## 2019-07-27 RX ORDER — LISINOPRIL 20 MG/1
20 TABLET ORAL EVERY EVENING
COMMUNITY

## 2019-07-27 ASSESSMENT — ENCOUNTER SYMPTOMS
UNEXPECTED WEIGHT CHANGE: 1
SHORTNESS OF BREATH: 0
EYE DISCHARGE: 1

## 2019-07-27 ASSESSMENT — MIFFLIN-ST. JEOR: SCORE: 1750.76

## 2019-07-28 NOTE — ED TRIAGE NOTES
"Patient presents with complaints of ankel and lower leg swelling and noticed his waist band is getting tighter. Noted increasing weight 1.5 lb per day for the last three days, modified diet and weight still increased. Patient also states that he feels like he is \"full of mucous\" and that he has been feeling low on energy the last couple of days. Stress test one year ago with no significant findings per patient.   "

## 2019-07-28 NOTE — ED PROVIDER NOTES
History     Chief Complaint:  Leg swelling    HPI   Juan Alberto Schultz is a 74 year old male with a familial history of heart failure (father) who presents with his wife, Rianna, to the emergency department for evaluation of bilateral leg swelling. He presents to the ED after a recommendation he received after calling his primary care physician with his symptoms. Patient report  an esophageal spasm a few days ago in which he coughed up thick, stringy mucus. Since then, he has experienced itchy, discharge from his eyes and fluid running out of his ears. He reports he has been experiencing a continued productive cough and has gained 3-4 pounds in the last 3 days despite being on a diet. He reports noticing increased bilateral leg swelling this morning, which concerned him.  Patient denies chest pain, shortness of breath, smoking, alcohol, or drug use. Of note, the patient had a normal cardiac stress test a few years ago per his report. I do not see that report in the medical record.     Allergies:  Antihistamines  Valium     Medications:    81 mg Aspirin  Lipitor  Lisinopril    Past Medical History:    Allergic state  Migraines  Umbilical hernia    Past Surgical History:    Orthopedic surgery    Family History:    History reviewed. No pertinent family history.     Social History:  Smoking status: Never  Alcohol use: No  The patient presents to the emergency department with his wife.  The patient lives in a condo with his wife.  Marital Status:   [2]     Review of Systems   Constitutional: Positive for unexpected weight change.   HENT: Positive for ear discharge.    Eyes: Positive for discharge.   Respiratory: Negative for shortness of breath.    Cardiovascular: Positive for leg swelling. Negative for chest pain.   All other systems reviewed and are negative.      Physical Exam     Patient Vitals for the past 24 hrs:   BP Temp Temp src Heart Rate Resp SpO2 Height Weight   07/27/19 1955 148/82 97.9  F (36.6  C) Oral  "85 17 95 % 1.765 m (5' 9.5\") 100.2 kg (221 lb)     Physical Exam  Constitutional:  Oriented to person, place, and time.      Appears well-developed and well-nourished.   HENT:   Head:    Normocephalic and atraumatic.   Right Ear:   Tympanic membrane and external ear normal.   Left Ear:   Tympanic membrane and external ear normal.   Mouth/Throat:   Oropharynx is clear and moist.      Mucous membranes are normal.   Eyes:    Conjunctivae normal and EOM are normal.      Pupils are equal, round, and reactive to light.   Neck:    Normal range of motion. Neck supple.   Cardiovascular:  Normal rate, regular rhythm, S1 normal and S2 normal.      No gallop and no friction rub. No murmur heard.   Pulmonary/Chest:  Breath sounds normal. No respiratory distress.      No wheezes. No rhonchi. No rales.   Abdominal:   Soft. No hepatosplenomegaly. No tenderness.      No rebound and no CVA tenderness.   Musculoskeletal:  Normal range of motion. Trace to 1+ edema lower extremities  Neurological:   Alert and oriented to person, place, and time. Normal strength.      GCS eye subscore is 4. GCS verbal subscore is 5.      GCS motor subscore is 6.   Skin:    Skin is warm and dry.   Psychiatric:   Normal mood and affect.      Speech is normal and behavior is normal.      Judgment and thought content normal.      Cognition and memory are normal.    Emergency Department Course   ECG (20:04:09):  Rate 79 bpm. NM interval 164. QRS duration 96. QT/QTc 386/442. P-R-T axes 53 26 55. Normal sinus rhythm. Possible Left atrial enlargement. Borderline ECG. No significant change when compared to EKG dated 10/3/18. Interpreted at 2008 by Danelle Garcia MD.    Imaging:  Radiographic findings were communicated with the patient and family who voiced understanding of the findings.    XR Chest 2 Views  IMPRESSION: No acute cardiopulmonary disease.  As read by Radiology.    Laboratory:  CBC: AWNL (WBC 5.0, HGB 14.7, )  CMP: Glucose 113 (H) o/w " WNL (Creatinine 0.82)    Troponin I (2022): <0.015  BNP: 94    Emergency Department Course:  Past medical records, nursing notes, and vitals reviewed.  2038: I performed an exam of the patient and obtained history, as documented above.      IV inserted and blood drawn.    The patient was sent for a chest x-ray while in the emergency department, findings above.    2129: I rechecked the patient. Findings and plan explained to the Patient and spouse. Patient discharged home with instructions regarding supportive care, medications, and reasons to return. The importance of close follow-up was reviewed.   Impression & Plan    Medical Decision Making:  The patient comes in with concern of leg swelling and feeling like tightness in his abdomen or the last couple days with a weight gain of 3 to 4 pounds.  He has not had this previously.  He denies any chest pain or shortness of breath.  Examination shows he is trace to 1+ edema both lower extremities.  Patient also has concern of mucus coming out of his eyes and ears and throat and I do not see any significant abnormalities on examination.  His laboratory work and chest x-ray and EKG are all normal.  I think he can be treated with some Dyazide and follow-up with his primary physician.  He may have some edema due to heat or less activity.  He can elevate as needed.  He can follow-up sooner if worse.    Diagnosis:    ICD-10-CM   1. Edema, unspecified type R60.9     Disposition:  Discharged to home with his wife and instructions for follow up.    Discharge Medications:  Started    triamterene-HCTZ 37.5-25 MG capsule  Commonly known as:  DYAZIDE  1 capsule, Oral, EVERY MORNING       Abdon Pope  7/27/2019    EMERGENCY DEPARTMENT  Scribe Disclosure:  Abdon NORWOOD, am serving as a scribe on 7/27/2019 at 9:55 PM to personally document services performed by Danelle Garcia MD based on my observations and the provider's statements to me.     Scribe Disclosure:  Val NORWOOD  Liang, am serving as a scribe at 10:07 PM on 7/27/2019 to document services personally performed by Danelle Garcia MD based on my observations and the provider's statements to me.      Danelle Garcia MD  07/27/19 5847

## 2019-07-31 ENCOUNTER — APPOINTMENT (OUTPATIENT)
Dept: GENERAL RADIOLOGY | Facility: CLINIC | Age: 74
End: 2019-07-31
Attending: EMERGENCY MEDICINE
Payer: MEDICARE

## 2019-07-31 ENCOUNTER — HOSPITAL ENCOUNTER (EMERGENCY)
Facility: CLINIC | Age: 74
Discharge: HOME | End: 2019-07-31
Attending: EMERGENCY MEDICINE | Admitting: EMERGENCY MEDICINE
Payer: MEDICARE

## 2019-07-31 VITALS
HEART RATE: 71 BPM | RESPIRATION RATE: 23 BRPM | OXYGEN SATURATION: 93 % | TEMPERATURE: 98 F | DIASTOLIC BLOOD PRESSURE: 75 MMHG | BODY MASS INDEX: 30.81 KG/M2 | SYSTOLIC BLOOD PRESSURE: 124 MMHG | WEIGHT: 208 LBS | HEIGHT: 69 IN

## 2019-07-31 DIAGNOSIS — E86.0 DEHYDRATION: ICD-10-CM

## 2019-07-31 DIAGNOSIS — F41.9 ANXIETY: ICD-10-CM

## 2019-07-31 DIAGNOSIS — K22.4 ESOPHAGEAL SPASM: ICD-10-CM

## 2019-07-31 LAB
ALBUMIN SERPL-MCNC: 3.7 G/DL (ref 3.4–5)
ALBUMIN UR-MCNC: NEGATIVE MG/DL
ALP SERPL-CCNC: 92 U/L (ref 40–150)
ALT SERPL W P-5'-P-CCNC: 35 U/L (ref 0–70)
ANION GAP SERPL CALCULATED.3IONS-SCNC: 8 MMOL/L (ref 3–14)
APPEARANCE UR: CLEAR
AST SERPL W P-5'-P-CCNC: 28 U/L (ref 0–45)
BASOPHILS # BLD AUTO: 0 10E9/L (ref 0–0.2)
BASOPHILS NFR BLD AUTO: 0.2 %
BILIRUB SERPL-MCNC: 0.8 MG/DL (ref 0.2–1.3)
BILIRUB UR QL STRIP: NEGATIVE
BUN SERPL-MCNC: 19 MG/DL (ref 7–30)
CALCIUM SERPL-MCNC: 9 MG/DL (ref 8.5–10.1)
CHLORIDE SERPL-SCNC: 107 MMOL/L (ref 94–109)
CK SERPL-CCNC: 200 U/L (ref 30–300)
CO2 SERPL-SCNC: 24 MMOL/L (ref 20–32)
COLOR UR AUTO: YELLOW
CREAT SERPL-MCNC: 1.3 MG/DL (ref 0.66–1.25)
DIFFERENTIAL METHOD BLD: NORMAL
EOSINOPHIL # BLD AUTO: 0.1 10E9/L (ref 0–0.7)
EOSINOPHIL NFR BLD AUTO: 2.2 %
ERYTHROCYTE [DISTWIDTH] IN BLOOD BY AUTOMATED COUNT: 14 % (ref 10–15)
GFR SERPL CREATININE-BSD FRML MDRD: 54 ML/MIN/{1.73_M2}
GLUCOSE SERPL-MCNC: 122 MG/DL (ref 70–99)
GLUCOSE UR STRIP-MCNC: NEGATIVE MG/DL
HCT VFR BLD AUTO: 43.6 % (ref 40–53)
HGB BLD-MCNC: 15 G/DL (ref 13.3–17.7)
HGB UR QL STRIP: NEGATIVE
IMM GRANULOCYTES # BLD: 0 10E9/L (ref 0–0.4)
IMM GRANULOCYTES NFR BLD: 0.2 %
INTERPRETATION ECG - MUSE: NORMAL
KETONES UR STRIP-MCNC: 5 MG/DL
LEUKOCYTE ESTERASE UR QL STRIP: NEGATIVE
LYMPHOCYTES # BLD AUTO: 1.4 10E9/L (ref 0.8–5.3)
LYMPHOCYTES NFR BLD AUTO: 22.1 %
MAGNESIUM SERPL-MCNC: 2.2 MG/DL (ref 1.6–2.3)
MCH RBC QN AUTO: 31.2 PG (ref 26.5–33)
MCHC RBC AUTO-ENTMCNC: 34.4 G/DL (ref 31.5–36.5)
MCV RBC AUTO: 91 FL (ref 78–100)
MONOCYTES # BLD AUTO: 0.8 10E9/L (ref 0–1.3)
MONOCYTES NFR BLD AUTO: 12.2 %
NEUTROPHILS # BLD AUTO: 4 10E9/L (ref 1.6–8.3)
NEUTROPHILS NFR BLD AUTO: 63.1 %
NITRATE UR QL: NEGATIVE
NRBC # BLD AUTO: 0 10*3/UL
NRBC BLD AUTO-RTO: 0 /100
PH UR STRIP: 6.5 PH (ref 5–7)
PHOSPHATE SERPL-MCNC: 3.1 MG/DL (ref 2.5–4.5)
PLATELET # BLD AUTO: 164 10E9/L (ref 150–450)
POTASSIUM SERPL-SCNC: 3.8 MMOL/L (ref 3.4–5.3)
PROT SERPL-MCNC: 7.3 G/DL (ref 6.8–8.8)
RBC # BLD AUTO: 4.81 10E12/L (ref 4.4–5.9)
SODIUM SERPL-SCNC: 139 MMOL/L (ref 133–144)
SOURCE: ABNORMAL
SP GR UR STRIP: 1.01 (ref 1–1.03)
UROBILINOGEN UR STRIP-MCNC: NORMAL MG/DL (ref 0–2)
WBC # BLD AUTO: 6.4 10E9/L (ref 4–11)

## 2019-07-31 PROCEDURE — 71046 X-RAY EXAM CHEST 2 VIEWS: CPT

## 2019-07-31 PROCEDURE — 96361 HYDRATE IV INFUSION ADD-ON: CPT

## 2019-07-31 PROCEDURE — 83735 ASSAY OF MAGNESIUM: CPT | Performed by: EMERGENCY MEDICINE

## 2019-07-31 PROCEDURE — 80053 COMPREHEN METABOLIC PANEL: CPT | Performed by: EMERGENCY MEDICINE

## 2019-07-31 PROCEDURE — 85025 COMPLETE CBC W/AUTO DIFF WBC: CPT | Performed by: EMERGENCY MEDICINE

## 2019-07-31 PROCEDURE — 99285 EMERGENCY DEPT VISIT HI MDM: CPT | Mod: 25

## 2019-07-31 PROCEDURE — 84100 ASSAY OF PHOSPHORUS: CPT | Performed by: EMERGENCY MEDICINE

## 2019-07-31 PROCEDURE — 82550 ASSAY OF CK (CPK): CPT | Performed by: EMERGENCY MEDICINE

## 2019-07-31 PROCEDURE — 93005 ELECTROCARDIOGRAM TRACING: CPT

## 2019-07-31 PROCEDURE — 96374 THER/PROPH/DIAG INJ IV PUSH: CPT

## 2019-07-31 PROCEDURE — 25800030 ZZH RX IP 258 OP 636: Performed by: EMERGENCY MEDICINE

## 2019-07-31 PROCEDURE — 25000128 H RX IP 250 OP 636: Performed by: EMERGENCY MEDICINE

## 2019-07-31 PROCEDURE — 81003 URINALYSIS AUTO W/O SCOPE: CPT | Performed by: EMERGENCY MEDICINE

## 2019-07-31 RX ORDER — LORAZEPAM 1 MG/1
1 TABLET ORAL DAILY PRN
Qty: 3 TABLET | Refills: 0 | Status: SHIPPED | OUTPATIENT
Start: 2019-07-31 | End: 2019-10-04

## 2019-07-31 RX ORDER — LORAZEPAM 2 MG/ML
0.5 INJECTION INTRAMUSCULAR ONCE
Status: COMPLETED | OUTPATIENT
Start: 2019-07-31 | End: 2019-07-31

## 2019-07-31 RX ADMIN — LORAZEPAM 0.5 MG: 2 INJECTION INTRAMUSCULAR; INTRAVENOUS at 06:21

## 2019-07-31 RX ADMIN — SODIUM CHLORIDE, POTASSIUM CHLORIDE, SODIUM LACTATE AND CALCIUM CHLORIDE 500 ML: 600; 310; 30; 20 INJECTION, SOLUTION INTRAVENOUS at 06:22

## 2019-07-31 ASSESSMENT — ENCOUNTER SYMPTOMS: WEAKNESS: 1

## 2019-07-31 ASSESSMENT — MIFFLIN-ST. JEOR: SCORE: 1683.86

## 2019-07-31 NOTE — ED PROVIDER NOTES
"  History     Chief Complaint:  Generalized Weakness    HPI   Juan Alberto Schultz is a 74 year old male who presents to the emergency department today for evaluation of generalized weakness. The patient was seen earlier this week with Dr. Garcia on 07/27/19 complaining of bilateral leg swelling. This had been ongoing for 3 days prior with a productive cough. He had negative imaging and labs done at that time and was discharged with a prescription for Dyazide. Since that visit he has been taking his Dyazide as prescribed. His swelling has gone down completely now and is back to normal.      He reports having lost 5 pounds and has been generally weak, feeling like he cannot use his muscles. He notes a long 10 year history of esophageal spasm. He was having a typical spasm today. This morning he states he got up to use the restroom but fell in the bathroom due to feeling dizzy.  He further feels like he is unable to \"involuntarily breathe\" but when he thinks about breathing he is able to. He was able to stand up and make his way back to bed. He did not lose consciousness but states he felt faint.  He has no abdominal pain. He denies any chest pain. No injuries or pain from the fall. He reports he otherwise feels very anxious. His wife notes that he has been having more difficulty with memory recently.     XR Chest 2 Views 07/27/19  No acute cardiopulmonary disease.  As read by Radiology.    Laboratory 07/27/19  CBC: WBC 5.0, HGB 14.7,   CMP: Glucose 113 o/w WNL (Creatinine 0.82)  Troponin I: <0.015  BNP: 94     Allergies:  Antihistamines, Chlorpheniramine-Type [Alkylamines]  Valium [Diazepam]    Medications:    Aspirin 81  Lipitor  Lisinopril  Dyazide    Past Medical History:    Migraines  Allergic state    Past Surgical History:    History reviewed. No pertinent surgical history.    Family History:    History reviewed. No pertinent family history.    Social History:  The patient was accompanied to the ED by his " wife.  Smoking Status: Never Smoker  Smokeless Tobacco: Never Used  Alcohol Use: Negative     Marital Status:       Review of Systems   Neurological: Positive for weakness.   All other systems reviewed and are negative.    Physical Exam     No data found.   Physical Exam  General: Appears anxious. Nontoxic.   Head:  Scalp, face, and head appear normal, atraumatic. Scalp non tender to palpation.  Eyes:  Pupils are equal, round, and reactive to light, EOMI, no nystagmus     Conjunctivae non-injected and sclerae white  ENT:    The external nose is normal    Pinnae are normal    The oropharynx is normal, mucous membranes moist    Uvula is in the midline  Neck:  Normal range of motion    There is no rigidity noted    Trachea is in the midline  CV:  Regular rate and rhythm     Normal S1/S2, no S3/S4    No murmur or rub. Radial pulses 2+ bilaterally   Resp:  Lungs are clear and equal bilaterally    There is no tachypnea    No increased work of breathing    No rales, wheezing, or rhonchi  GI:  Abdomen is soft, no rigidity or guarding    No distension, or mass    No tenderness or rebound tenderness   MS:  Normal muscular tone    Symmetric motor strength    Trace bilateral lower extremity edema  Skin:  No rash or acute skin lesions noted  Neuro: Awake and alert, oriented x 3    CN II-XII intact. Strength 5/5 throughout. SILT throughout.    Speech is normal and fluent    Moves all extremities spontaneously  Psych:  Anxious affect.  Appropriate interactions.      Emergency Department Course     ECG:  ECG taken at 0519, ECG read at 0554  Normal sinus rhythm  Normal ECG  No significant change from prior ECG on 10/03/18  Rate 70 bpm. CO interval 174 ms. QRS duration 96 ms. QT/QTc 422/455 ms. P-R-T axes 52 30 42.    Imaging:  Radiology findings were communicated with the patient who voiced understanding of the findings.    XR Chest 2 Views  No acute abnormality.  Reading per radiology    Laboratory:  Laboratory findings were  communicated with the patient who voiced understanding of the findings.    Phosphorus: 3.1  CK: 200  Magnesium: 2.2  CBC: WBC 6.4, HGB 15.0,   CMP: Glucose 122, Creatinine 1.30, GFR 54, o/w WNL    Interventions:  0621 Ativan 0.5 mg IV  0622 NS, 500 mL, IV  Medications   LORazepam (ATIVAN) injection 0.5 mg (0.5 mg Intravenous Given 7/31/19 0621)   lactated ringers BOLUS 500 mL (0 mLs Intravenous Stopped 7/31/19 0804)     Emergency Department Course:    0520 IV was inserted and blood was drawn for laboratory testing, results above.    0530 Nursing notes and vitals reviewed.    0531 IV was inserted and blood was drawn for laboratory testing, results above.     0553 I performed an exam of the patient as documented above.     0625 The patient was sent for a XR while in the emergency department, results above.      0700 Patient was rechecked and updated.     Impression & Plan      Medical Decision Making:  Juan Alberto Schultz is a 74 year old male who presents to the emergency department today for evaluation of near syncope, lightheadedness and anxiety.  On my evaluation he is hemodynamically stable and well-appearing although anxious.  He has no external evidence of trauma on head to toe evaluation.  No evidence of head injury.  He has no focal neurologic deficits.  Broad differential diagnosis is considered.  He was recently started on diuretics with HCTZ during his recent emergency department visit for lower extremity edema.  This has improved.  Dehydration secondary to diuretics is considered as well as possible electrolyte derangement.  Signs and symptoms are not consistent with seizure.  He had no syncope.  No chest pain to suggest ACS.  EKG is without evidence of ischemia or dysrhythmia.  Patient was treated with IV fluids and Ativan in the emergency department and felt significantly improved.  This also stopped his esophageal spasm.  He has no signs or symptoms to suggest infection.   no hypoglycemia.  Electro  lites are normal.  He has mild elevation in creatinine likely consistent with dehydration secondary to recent diuretic use.  CBC is normal.  No anemia.  UA is normal no evidence of UTI.  Chest x-ray was obtained and was negative.  Patient felt significantly improved after the above interventions.  I recommend that he stop taking hydrochlorthiazide until he follows up with his primary care physician.  I encourage moderate fluid intake to prevent dehydration but also closely monitoring his lower extremity edema.  At this time he has no evidence to suggest congestive heart failure or other serious underlying process.  The patient and the patient's wife are agreeable to plan of care.  Close primary care follow-up was recommended.  Patient was discharged in stable and improved condition.    Diagnosis:      ICD-10-CM    1. Esophageal spasm K22.4 UA reflex to Microscopic and Culture   2. Anxiety F41.9    3. Dehydration E86.0      Disposition:   Discharged to home    Discharge Medications:    Discharge Medication List as of 7/31/2019  8:05 AM      START taking these medications    Details   LORazepam (ATIVAN) 1 MG tablet Take 1 tablet (1 mg) by mouth daily as needed for anxiety or muscle spasms, Disp-3 tablet, R-0, Local Print           Scribe Disclosure:  Eloy NORWOOD, am serving as a scribe at 5:43 AM on 7/31/2019 to document services personally performed by Aleksey Blas MD based on my observations and the provider's statements to me.     EMERGENCY DEPARTMENT       Aleksey Blas MD  08/01/19 8830

## 2019-07-31 NOTE — ED AVS SNAPSHOT
Emergency Department  [address] [place] [address]  Phone:  [phone]  Fax:  [phone]                                    Juan Alberto Schultz   MRN: [id number]    Department:   Emergency Department   Date of Visit:  7/31/2019           After Visit Summary Signature Page    I have received my discharge instructions, and my questions have been answered. I have discussed any challenges I see with this plan with the nurse or doctor.    ..........................................................................................................................................  Patient/Patient Representative Signature      ..........................................................................................................................................  Patient Representative Print Name and Relationship to Patient    ..................................................               ................................................  Date                                   Time    ..........................................................................................................................................  Reviewed by Signature/Title    ...................................................              ..............................................  Date                                               Time          22EPIC Rev 08/18

## 2019-07-31 NOTE — ED TRIAGE NOTES
Pt was seen last Sat. Started on Triamterene-hydrochlorothiazide, reports lost 5 lbs and c/o generalized muscle weakness.states feels like he can't use his muscles.

## 2019-07-31 NOTE — ED NOTES
Bed: ED05  Expected date:   Expected time:   Means of arrival:   Comments:  Leola 74 male dehydration

## 2019-09-06 ENCOUNTER — TRANSFERRED RECORDS (OUTPATIENT)
Dept: HEALTH INFORMATION MANAGEMENT | Facility: CLINIC | Age: 74
End: 2019-09-06

## 2019-10-02 ENCOUNTER — TRANSFERRED RECORDS (OUTPATIENT)
Dept: HEALTH INFORMATION MANAGEMENT | Facility: CLINIC | Age: 74
End: 2019-10-02

## 2019-10-04 ENCOUNTER — APPOINTMENT (OUTPATIENT)
Dept: ULTRASOUND IMAGING | Facility: CLINIC | Age: 74
End: 2019-10-04
Attending: EMERGENCY MEDICINE
Payer: MEDICARE

## 2019-10-04 ENCOUNTER — HOSPITAL ENCOUNTER (OUTPATIENT)
Facility: CLINIC | Age: 74
Setting detail: OBSERVATION
Discharge: HOME | End: 2019-10-05
Attending: EMERGENCY MEDICINE | Admitting: SURGERY
Payer: MEDICARE

## 2019-10-04 ENCOUNTER — ANESTHESIA (OUTPATIENT)
Dept: SURGERY | Facility: CLINIC | Age: 74
End: 2019-10-04
Payer: MEDICARE

## 2019-10-04 ENCOUNTER — ANESTHESIA EVENT (OUTPATIENT)
Dept: SURGERY | Facility: CLINIC | Age: 74
End: 2019-10-04
Payer: MEDICARE

## 2019-10-04 DIAGNOSIS — K81.0 ACUTE CHOLECYSTITIS: Primary | ICD-10-CM

## 2019-10-04 PROBLEM — I10 ESSENTIAL HYPERTENSION: Status: ACTIVE | Noted: 2019-10-04

## 2019-10-04 PROBLEM — F41.1 GENERALIZED ANXIETY DISORDER: Status: ACTIVE | Noted: 2019-10-04

## 2019-10-04 PROBLEM — E78.5 HYPERLIPIDEMIA LDL GOAL <100: Status: ACTIVE | Noted: 2019-10-04

## 2019-10-04 LAB
ALBUMIN SERPL-MCNC: 3.6 G/DL (ref 3.4–5)
ALBUMIN UR-MCNC: NEGATIVE MG/DL
ALP SERPL-CCNC: 92 U/L (ref 40–150)
ALT SERPL W P-5'-P-CCNC: 33 U/L (ref 0–70)
ANION GAP SERPL CALCULATED.3IONS-SCNC: 2 MMOL/L (ref 3–14)
APPEARANCE UR: CLEAR
AST SERPL W P-5'-P-CCNC: 21 U/L (ref 0–45)
BASOPHILS # BLD AUTO: 0 10E9/L (ref 0–0.2)
BASOPHILS NFR BLD AUTO: 0.2 %
BILIRUB SERPL-MCNC: 0.7 MG/DL (ref 0.2–1.3)
BILIRUB UR QL STRIP: NEGATIVE
BUN SERPL-MCNC: 16 MG/DL (ref 7–30)
CALCIUM SERPL-MCNC: 8.8 MG/DL (ref 8.5–10.1)
CHLORIDE SERPL-SCNC: 110 MMOL/L (ref 94–109)
CO2 SERPL-SCNC: 28 MMOL/L (ref 20–32)
COLOR UR AUTO: YELLOW
CREAT SERPL-MCNC: 0.82 MG/DL (ref 0.66–1.25)
DIFFERENTIAL METHOD BLD: NORMAL
EOSINOPHIL # BLD AUTO: 0.2 10E9/L (ref 0–0.7)
EOSINOPHIL NFR BLD AUTO: 3.2 %
ERYTHROCYTE [DISTWIDTH] IN BLOOD BY AUTOMATED COUNT: 14 % (ref 10–15)
GFR SERPL CREATININE-BSD FRML MDRD: 88 ML/MIN/{1.73_M2}
GLUCOSE SERPL-MCNC: 121 MG/DL (ref 70–99)
GLUCOSE UR STRIP-MCNC: NEGATIVE MG/DL
HCT VFR BLD AUTO: 44.8 % (ref 40–53)
HGB BLD-MCNC: 15.5 G/DL (ref 13.3–17.7)
HGB UR QL STRIP: NEGATIVE
IMM GRANULOCYTES # BLD: 0 10E9/L (ref 0–0.4)
IMM GRANULOCYTES NFR BLD: 0.2 %
INTERPRETATION ECG - MUSE: NORMAL
KETONES UR STRIP-MCNC: NEGATIVE MG/DL
LEUKOCYTE ESTERASE UR QL STRIP: NEGATIVE
LIPASE SERPL-CCNC: 135 U/L (ref 73–393)
LYMPHOCYTES # BLD AUTO: 1.6 10E9/L (ref 0.8–5.3)
LYMPHOCYTES NFR BLD AUTO: 29.2 %
MCH RBC QN AUTO: 31.9 PG (ref 26.5–33)
MCHC RBC AUTO-ENTMCNC: 34.6 G/DL (ref 31.5–36.5)
MCV RBC AUTO: 92 FL (ref 78–100)
MONOCYTES # BLD AUTO: 0.7 10E9/L (ref 0–1.3)
MONOCYTES NFR BLD AUTO: 11.9 %
MUCOUS THREADS #/AREA URNS LPF: PRESENT /LPF
NEUTROPHILS # BLD AUTO: 3.1 10E9/L (ref 1.6–8.3)
NEUTROPHILS NFR BLD AUTO: 55.3 %
NITRATE UR QL: NEGATIVE
NRBC # BLD AUTO: 0 10*3/UL
NRBC BLD AUTO-RTO: 0 /100
PH UR STRIP: 6 PH (ref 5–7)
PLATELET # BLD AUTO: 181 10E9/L (ref 150–450)
POTASSIUM SERPL-SCNC: 4 MMOL/L (ref 3.4–5.3)
PROT SERPL-MCNC: 7.3 G/DL (ref 6.8–8.8)
RBC # BLD AUTO: 4.86 10E12/L (ref 4.4–5.9)
RBC #/AREA URNS AUTO: <1 /HPF (ref 0–2)
SODIUM SERPL-SCNC: 140 MMOL/L (ref 133–144)
SOURCE: ABNORMAL
SP GR UR STRIP: 1.02 (ref 1–1.03)
TROPONIN I SERPL-MCNC: <0.015 UG/L (ref 0–0.04)
UROBILINOGEN UR STRIP-MCNC: NORMAL MG/DL (ref 0–2)
WBC # BLD AUTO: 5.6 10E9/L (ref 4–11)
WBC #/AREA URNS AUTO: <1 /HPF (ref 0–5)

## 2019-10-04 PROCEDURE — 25000132 ZZH RX MED GY IP 250 OP 250 PS 637: Mod: GY | Performed by: INTERNAL MEDICINE

## 2019-10-04 PROCEDURE — 71000013 ZZH RECOVERY PHASE 1 LEVEL 1 EA ADDTL HR: Performed by: SURGERY

## 2019-10-04 PROCEDURE — 96365 THER/PROPH/DIAG IV INF INIT: CPT

## 2019-10-04 PROCEDURE — 37000008 ZZH ANESTHESIA TECHNICAL FEE, 1ST 30 MIN: Performed by: SURGERY

## 2019-10-04 PROCEDURE — 27210794 ZZH OR GENERAL SUPPLY STERILE: Performed by: SURGERY

## 2019-10-04 PROCEDURE — 25800030 ZZH RX IP 258 OP 636: Performed by: ANESTHESIOLOGY

## 2019-10-04 PROCEDURE — 36000058 ZZH SURGERY LEVEL 3 EA 15 ADDTL MIN: Performed by: SURGERY

## 2019-10-04 PROCEDURE — 85025 COMPLETE CBC W/AUTO DIFF WBC: CPT | Performed by: EMERGENCY MEDICINE

## 2019-10-04 PROCEDURE — 71000012 ZZH RECOVERY PHASE 1 LEVEL 1 FIRST HR: Performed by: SURGERY

## 2019-10-04 PROCEDURE — 25000566 ZZH SEVOFLURANE, EA 15 MIN: Performed by: SURGERY

## 2019-10-04 PROCEDURE — 40000170 ZZH STATISTIC PRE-PROCEDURE ASSESSMENT II: Performed by: SURGERY

## 2019-10-04 PROCEDURE — 25000125 ZZHC RX 250: Performed by: SURGERY

## 2019-10-04 PROCEDURE — 99220 ZZC INITIAL OBSERVATION CARE,LEVL III: CPT | Performed by: INTERNAL MEDICINE

## 2019-10-04 PROCEDURE — 93005 ELECTROCARDIOGRAM TRACING: CPT

## 2019-10-04 PROCEDURE — 25000128 H RX IP 250 OP 636: Performed by: NURSE ANESTHETIST, CERTIFIED REGISTERED

## 2019-10-04 PROCEDURE — 88342 IMHCHEM/IMCYTCHM 1ST ANTB: CPT | Mod: 26 | Performed by: SURGERY

## 2019-10-04 PROCEDURE — 37000009 ZZH ANESTHESIA TECHNICAL FEE, EACH ADDTL 15 MIN: Performed by: SURGERY

## 2019-10-04 PROCEDURE — 25800030 ZZH RX IP 258 OP 636: Performed by: EMERGENCY MEDICINE

## 2019-10-04 PROCEDURE — 25000128 H RX IP 250 OP 636: Performed by: EMERGENCY MEDICINE

## 2019-10-04 PROCEDURE — 40000065 ZZH STATISTIC EKG NON-CHARGEABLE

## 2019-10-04 PROCEDURE — 25800025 ZZH RX 258: Performed by: SURGERY

## 2019-10-04 PROCEDURE — 88304 TISSUE EXAM BY PATHOLOGIST: CPT | Mod: 26 | Performed by: SURGERY

## 2019-10-04 PROCEDURE — 88342 IMHCHEM/IMCYTCHM 1ST ANTB: CPT | Performed by: SURGERY

## 2019-10-04 PROCEDURE — 88304 TISSUE EXAM BY PATHOLOGIST: CPT | Performed by: SURGERY

## 2019-10-04 PROCEDURE — 81001 URINALYSIS AUTO W/SCOPE: CPT | Performed by: EMERGENCY MEDICINE

## 2019-10-04 PROCEDURE — 99285 EMERGENCY DEPT VISIT HI MDM: CPT | Mod: 25

## 2019-10-04 PROCEDURE — 99207 ZZC CDG-CODE CATEGORY CHANGED: CPT | Performed by: INTERNAL MEDICINE

## 2019-10-04 PROCEDURE — 36000056 ZZH SURGERY LEVEL 3 1ST 30 MIN: Performed by: SURGERY

## 2019-10-04 PROCEDURE — 80053 COMPREHEN METABOLIC PANEL: CPT | Performed by: EMERGENCY MEDICINE

## 2019-10-04 PROCEDURE — 25000125 ZZHC RX 250: Performed by: NURSE ANESTHETIST, CERTIFIED REGISTERED

## 2019-10-04 PROCEDURE — 47562 LAPAROSCOPIC CHOLECYSTECTOMY: CPT | Performed by: SURGERY

## 2019-10-04 PROCEDURE — 83690 ASSAY OF LIPASE: CPT | Performed by: EMERGENCY MEDICINE

## 2019-10-04 PROCEDURE — G0378 HOSPITAL OBSERVATION PER HR: HCPCS

## 2019-10-04 PROCEDURE — 84484 ASSAY OF TROPONIN QUANT: CPT | Performed by: EMERGENCY MEDICINE

## 2019-10-04 PROCEDURE — 99204 OFFICE O/P NEW MOD 45 MIN: CPT | Mod: 57 | Performed by: SURGERY

## 2019-10-04 PROCEDURE — 96375 TX/PRO/DX INJ NEW DRUG ADDON: CPT | Mod: 59

## 2019-10-04 PROCEDURE — 96361 HYDRATE IV INFUSION ADD-ON: CPT

## 2019-10-04 PROCEDURE — 76705 ECHO EXAM OF ABDOMEN: CPT

## 2019-10-04 RX ORDER — SODIUM CHLORIDE, SODIUM LACTATE, POTASSIUM CHLORIDE, CALCIUM CHLORIDE 600; 310; 30; 20 MG/100ML; MG/100ML; MG/100ML; MG/100ML
INJECTION, SOLUTION INTRAVENOUS CONTINUOUS
Status: CANCELLED | OUTPATIENT
Start: 2019-10-04

## 2019-10-04 RX ORDER — PIPERACILLIN SODIUM, TAZOBACTAM SODIUM 3; .375 G/15ML; G/15ML
3.38 INJECTION, POWDER, LYOPHILIZED, FOR SOLUTION INTRAVENOUS ONCE
Status: COMPLETED | OUTPATIENT
Start: 2019-10-04 | End: 2019-10-04

## 2019-10-04 RX ORDER — ACETAMINOPHEN 325 MG/1
650 TABLET ORAL EVERY 4 HOURS PRN
Status: DISCONTINUED | OUTPATIENT
Start: 2019-10-04 | End: 2019-10-05 | Stop reason: HOSPADM

## 2019-10-04 RX ORDER — HYDROCODONE BITARTRATE AND ACETAMINOPHEN 5; 325 MG/1; MG/1
1-2 TABLET ORAL EVERY 4 HOURS PRN
Status: DISCONTINUED | OUTPATIENT
Start: 2019-10-04 | End: 2019-10-05 | Stop reason: HOSPADM

## 2019-10-04 RX ORDER — FENTANYL CITRATE 50 UG/ML
INJECTION, SOLUTION INTRAMUSCULAR; INTRAVENOUS PRN
Status: DISCONTINUED | OUTPATIENT
Start: 2019-10-04 | End: 2019-10-04

## 2019-10-04 RX ORDER — PROPOFOL 10 MG/ML
INJECTION, EMULSION INTRAVENOUS PRN
Status: DISCONTINUED | OUTPATIENT
Start: 2019-10-04 | End: 2019-10-04

## 2019-10-04 RX ORDER — CEFTRIAXONE 1 G/1
1 INJECTION, POWDER, FOR SOLUTION INTRAMUSCULAR; INTRAVENOUS EVERY 24 HOURS
Status: CANCELLED | OUTPATIENT
Start: 2019-10-04

## 2019-10-04 RX ORDER — LIDOCAINE HYDROCHLORIDE 20 MG/ML
INJECTION, SOLUTION INFILTRATION; PERINEURAL PRN
Status: DISCONTINUED | OUTPATIENT
Start: 2019-10-04 | End: 2019-10-04

## 2019-10-04 RX ORDER — AMOXICILLIN 250 MG
1-2 CAPSULE ORAL 2 TIMES DAILY
Qty: 30 TABLET | Refills: 0 | Status: SHIPPED | OUTPATIENT
Start: 2019-10-04 | End: 2023-11-20

## 2019-10-04 RX ORDER — HYDROMORPHONE HYDROCHLORIDE 1 MG/ML
.3-.5 INJECTION, SOLUTION INTRAMUSCULAR; INTRAVENOUS; SUBCUTANEOUS EVERY 5 MIN PRN
Status: CANCELLED | OUTPATIENT
Start: 2019-10-04

## 2019-10-04 RX ORDER — ACETAMINOPHEN 650 MG/1
650 SUPPOSITORY RECTAL EVERY 4 HOURS PRN
Status: DISCONTINUED | OUTPATIENT
Start: 2019-10-04 | End: 2019-10-05 | Stop reason: HOSPADM

## 2019-10-04 RX ORDER — HYDROMORPHONE HYDROCHLORIDE 1 MG/ML
0.5 INJECTION, SOLUTION INTRAMUSCULAR; INTRAVENOUS; SUBCUTANEOUS
Status: DISCONTINUED | OUTPATIENT
Start: 2019-10-04 | End: 2019-10-04

## 2019-10-04 RX ORDER — LIDOCAINE 40 MG/G
CREAM TOPICAL
Status: DISCONTINUED | OUTPATIENT
Start: 2019-10-04 | End: 2019-10-05 | Stop reason: HOSPADM

## 2019-10-04 RX ORDER — ONDANSETRON 2 MG/ML
4 INJECTION INTRAMUSCULAR; INTRAVENOUS EVERY 6 HOURS PRN
Status: DISCONTINUED | OUTPATIENT
Start: 2019-10-04 | End: 2019-10-05 | Stop reason: HOSPADM

## 2019-10-04 RX ORDER — OXYCODONE HYDROCHLORIDE 5 MG/1
5 TABLET ORAL
Status: DISCONTINUED | OUTPATIENT
Start: 2019-10-04 | End: 2019-10-05 | Stop reason: HOSPADM

## 2019-10-04 RX ORDER — LISINOPRIL 10 MG/1
40 TABLET ORAL DAILY
Status: DISCONTINUED | OUTPATIENT
Start: 2019-10-04 | End: 2019-10-04

## 2019-10-04 RX ORDER — OXYCODONE HYDROCHLORIDE 5 MG/1
5 TABLET ORAL EVERY 4 HOURS PRN
Qty: 15 TABLET | Refills: 0 | Status: SHIPPED | OUTPATIENT
Start: 2019-10-04 | End: 2019-10-05

## 2019-10-04 RX ORDER — LISINOPRIL 20 MG/1
20 TABLET ORAL EVERY EVENING
Status: DISCONTINUED | OUTPATIENT
Start: 2019-10-04 | End: 2019-10-05 | Stop reason: HOSPADM

## 2019-10-04 RX ORDER — ONDANSETRON 2 MG/ML
4 INJECTION INTRAMUSCULAR; INTRAVENOUS EVERY 30 MIN PRN
Status: DISCONTINUED | OUTPATIENT
Start: 2019-10-04 | End: 2019-10-04

## 2019-10-04 RX ORDER — SODIUM CHLORIDE 9 MG/ML
1000 INJECTION, SOLUTION INTRAVENOUS CONTINUOUS
Status: DISCONTINUED | OUTPATIENT
Start: 2019-10-04 | End: 2019-10-04

## 2019-10-04 RX ORDER — FENTANYL CITRATE 50 UG/ML
25-50 INJECTION, SOLUTION INTRAMUSCULAR; INTRAVENOUS
Status: CANCELLED | OUTPATIENT
Start: 2019-10-04

## 2019-10-04 RX ORDER — AMOXICILLIN 250 MG
2 CAPSULE ORAL 2 TIMES DAILY PRN
Status: DISCONTINUED | OUTPATIENT
Start: 2019-10-04 | End: 2019-10-05 | Stop reason: HOSPADM

## 2019-10-04 RX ORDER — AMOXICILLIN 250 MG
1 CAPSULE ORAL 2 TIMES DAILY PRN
Status: DISCONTINUED | OUTPATIENT
Start: 2019-10-04 | End: 2019-10-05 | Stop reason: HOSPADM

## 2019-10-04 RX ORDER — MAGNESIUM HYDROXIDE 1200 MG/15ML
LIQUID ORAL PRN
Status: DISCONTINUED | OUTPATIENT
Start: 2019-10-04 | End: 2019-10-04 | Stop reason: HOSPADM

## 2019-10-04 RX ORDER — LIDOCAINE 40 MG/G
CREAM TOPICAL
Status: DISCONTINUED | OUTPATIENT
Start: 2019-10-04 | End: 2019-10-04

## 2019-10-04 RX ORDER — NALOXONE HYDROCHLORIDE 0.4 MG/ML
.1-.4 INJECTION, SOLUTION INTRAMUSCULAR; INTRAVENOUS; SUBCUTANEOUS
Status: DISCONTINUED | OUTPATIENT
Start: 2019-10-04 | End: 2019-10-05 | Stop reason: HOSPADM

## 2019-10-04 RX ORDER — ACETAMINOPHEN 325 MG/1
650 TABLET ORAL EVERY 4 HOURS PRN
Qty: 100 TABLET | Refills: 0 | Status: SHIPPED | OUTPATIENT
Start: 2019-10-04 | End: 2023-11-20

## 2019-10-04 RX ORDER — ONDANSETRON 2 MG/ML
4 INJECTION INTRAMUSCULAR; INTRAVENOUS EVERY 30 MIN PRN
Status: CANCELLED | OUTPATIENT
Start: 2019-10-04

## 2019-10-04 RX ORDER — NALOXONE HYDROCHLORIDE 0.4 MG/ML
.1-.4 INJECTION, SOLUTION INTRAMUSCULAR; INTRAVENOUS; SUBCUTANEOUS
Status: DISCONTINUED | OUTPATIENT
Start: 2019-10-04 | End: 2019-10-04

## 2019-10-04 RX ORDER — SODIUM CHLORIDE, SODIUM LACTATE, POTASSIUM CHLORIDE, CALCIUM CHLORIDE 600; 310; 30; 20 MG/100ML; MG/100ML; MG/100ML; MG/100ML
INJECTION, SOLUTION INTRAVENOUS CONTINUOUS
Status: DISCONTINUED | OUTPATIENT
Start: 2019-10-04 | End: 2019-10-04 | Stop reason: HOSPADM

## 2019-10-04 RX ORDER — ONDANSETRON 4 MG/1
4 TABLET, ORALLY DISINTEGRATING ORAL EVERY 30 MIN PRN
Status: CANCELLED | OUTPATIENT
Start: 2019-10-04

## 2019-10-04 RX ORDER — HYDROMORPHONE HYDROCHLORIDE 1 MG/ML
0.2 INJECTION, SOLUTION INTRAMUSCULAR; INTRAVENOUS; SUBCUTANEOUS
Status: DISCONTINUED | OUTPATIENT
Start: 2019-10-04 | End: 2019-10-04

## 2019-10-04 RX ORDER — PROPOFOL 10 MG/ML
INJECTION, EMULSION INTRAVENOUS CONTINUOUS PRN
Status: DISCONTINUED | OUTPATIENT
Start: 2019-10-04 | End: 2019-10-04

## 2019-10-04 RX ORDER — CEFAZOLIN SODIUM 1 G/3ML
1 INJECTION, POWDER, FOR SOLUTION INTRAMUSCULAR; INTRAVENOUS SEE ADMIN INSTRUCTIONS
Status: DISCONTINUED | OUTPATIENT
Start: 2019-10-04 | End: 2019-10-04 | Stop reason: HOSPADM

## 2019-10-04 RX ORDER — IBUPROFEN 200 MG
200 TABLET ORAL EVERY 8 HOURS PRN
Status: ON HOLD | COMMUNITY
End: 2019-10-04

## 2019-10-04 RX ORDER — ONDANSETRON 4 MG/1
4 TABLET, ORALLY DISINTEGRATING ORAL EVERY 6 HOURS PRN
Status: DISCONTINUED | OUTPATIENT
Start: 2019-10-04 | End: 2019-10-05 | Stop reason: HOSPADM

## 2019-10-04 RX ORDER — CEFAZOLIN SODIUM 2 G/100ML
2 INJECTION, SOLUTION INTRAVENOUS
Status: DISCONTINUED | OUTPATIENT
Start: 2019-10-04 | End: 2019-10-04 | Stop reason: HOSPADM

## 2019-10-04 RX ORDER — HYDROMORPHONE HYDROCHLORIDE 1 MG/ML
0.2 INJECTION, SOLUTION INTRAMUSCULAR; INTRAVENOUS; SUBCUTANEOUS
Status: DISCONTINUED | OUTPATIENT
Start: 2019-10-04 | End: 2019-10-05 | Stop reason: HOSPADM

## 2019-10-04 RX ADMIN — FENTANYL CITRATE 100 MCG: 50 INJECTION, SOLUTION INTRAMUSCULAR; INTRAVENOUS at 13:15

## 2019-10-04 RX ADMIN — PIPERACILLIN SODIUM,TAZOBACTAM SODIUM 3.38 G: 3; .375 INJECTION, POWDER, FOR SOLUTION INTRAVENOUS at 10:21

## 2019-10-04 RX ADMIN — PROPOFOL 200 MG: 10 INJECTION, EMULSION INTRAVENOUS at 13:15

## 2019-10-04 RX ADMIN — SODIUM CHLORIDE, POTASSIUM CHLORIDE, SODIUM LACTATE AND CALCIUM CHLORIDE: 600; 310; 30; 20 INJECTION, SOLUTION INTRAVENOUS at 13:53

## 2019-10-04 RX ADMIN — ROCURONIUM BROMIDE 10 MG: 10 INJECTION INTRAVENOUS at 13:26

## 2019-10-04 RX ADMIN — SODIUM CHLORIDE 1000 ML: 9 INJECTION, SOLUTION INTRAVENOUS at 10:08

## 2019-10-04 RX ADMIN — ONDANSETRON 4 MG: 2 INJECTION INTRAMUSCULAR; INTRAVENOUS at 07:59

## 2019-10-04 RX ADMIN — LISINOPRIL 20 MG: 20 TABLET ORAL at 19:04

## 2019-10-04 RX ADMIN — SUGAMMADEX 200 MG: 100 INJECTION, SOLUTION INTRAVENOUS at 14:08

## 2019-10-04 RX ADMIN — SODIUM CHLORIDE, POTASSIUM CHLORIDE, SODIUM LACTATE AND CALCIUM CHLORIDE: 600; 310; 30; 20 INJECTION, SOLUTION INTRAVENOUS at 12:54

## 2019-10-04 RX ADMIN — PROPOFOL 50 MG: 10 INJECTION, EMULSION INTRAVENOUS at 13:17

## 2019-10-04 RX ADMIN — LIDOCAINE HYDROCHLORIDE 70 MG: 20 INJECTION, SOLUTION INFILTRATION; PERINEURAL at 13:15

## 2019-10-04 RX ADMIN — ROCURONIUM BROMIDE 40 MG: 10 INJECTION INTRAVENOUS at 13:15

## 2019-10-04 RX ADMIN — PROPOFOL 100 MCG/KG/MIN: 10 INJECTION, EMULSION INTRAVENOUS at 13:15

## 2019-10-04 RX ADMIN — SODIUM CHLORIDE 1000 ML: 9 INJECTION, SOLUTION INTRAVENOUS at 07:57

## 2019-10-04 RX ADMIN — ROCURONIUM BROMIDE 10 MG: 10 INJECTION INTRAVENOUS at 13:29

## 2019-10-04 ASSESSMENT — MIFFLIN-ST. JEOR: SCORE: 1708.8

## 2019-10-04 ASSESSMENT — ENCOUNTER SYMPTOMS
FEVER: 0
CONSTIPATION: 0
FLANK PAIN: 1
ABDOMINAL PAIN: 1
DIARRHEA: 0

## 2019-10-04 ASSESSMENT — LIFESTYLE VARIABLES: TOBACCO_USE: 0

## 2019-10-04 NOTE — PROGRESS NOTES
RECEIVING UNIT ED HANDOFF REVIEW    ED Nurse Handoff Report was reviewed by: Alfreda Musa RN on October 4, 2019 at 11:05 AM

## 2019-10-04 NOTE — ED NOTES
Observation Brochure and Video    Family informed of observation status based on provider's order.  Observation brochure was given and the video watched. Patient/Family stated understanding. Questions answered.  Ally Hayes RN

## 2019-10-04 NOTE — PHARMACY-ADMISSION MEDICATION HISTORY
Admission medication history interview status for the 10/4/2019  admission is complete. See EPIC admission navigator for prior to admission medications     Medication history source reliability:Good    Actions taken by pharmacist (provider contacted, etc):None     Additional medication history information not noted on PTA med list :None    Medication reconciliation/reorder completed by provider prior to medication history? No    Time spent in this activity: 12min    Prior to Admission medications    Medication Sig Last Dose Taking? Auth Provider   aspirin (ASA) 81 MG chewable tablet Take 81 mg by mouth daily 10/3/2019 at hs Yes Reported, Patient   atorvastatin (LIPITOR) 40 MG tablet Take 40 mg by mouth daily 10/3/2019 at hs Yes Reported, Patient   ibuprofen (ADVIL/MOTRIN) 200 MG tablet Take 200 mg by mouth every 8 hours as needed for mild pain prn Yes Unknown, Entered By History   lisinopril (PRINIVIL/ZESTRIL) 40 MG tablet Take 20 mg by mouth daily  10/3/2019 at hs Yes Reported, Patient

## 2019-10-04 NOTE — BRIEF OP NOTE
[facility]    Brief Operative Note    Pre-operative diagnosis: Acute cholecystitis [K81.0]  Post-operative diagnosis same  Procedure: Procedure(s):  CHOLECYSTECTOMY, LAPAROSCOPIC  Surgeon: Surgeon(s) and Role:     * Juan Alberto Ramey MD - Primary     * Juan C Ramirez MD - Resident - Assisting  Anesthesia: General   Estimated blood loss: Less than 10 ml  Drains: None  Specimens:   ID Type Source Tests Collected by Time Destination   A :  Tissue Gallbladder and Contents SURGICAL PATHOLOGY EXAM Juan Alberto Ramey MD 10/4/2019  2:02 PM      Findings:   hydrops, edema. evidence of acute infection.  Complications: None.  Implants:  * No implants in log *     Will see how feeling post-op but likely home tonight.  Obs status if staying night

## 2019-10-04 NOTE — ED NOTES
"[facility]  ED Nurse Handoff Report    ED Chief complaint: Flank Pain and Abdominal Pain      ED Diagnosis:   Final diagnoses:   Acute cholecystitis       Code Status: Full Code    Allergies:   Allergies   Allergen Reactions     Antihistamines, Chlorpheniramine-Type [Alkylamines]      Valium [Diazepam]        Activity level - Baseline/Home:  Independent but uses cane and walker  Activity Level - Current:   Independent    Patient's Preferred language: english   Needed?: No    Isolation: No  Infection: Not Applicable  Bariatric?: No    Vital Signs:   Vitals:    10/04/19 0713 10/04/19 1006   BP: (!) 181/99 (!) 166/92   Pulse: 76 71   Resp: 20 16   Temp: 97.1  F (36.2  C)    TempSrc: Oral    SpO2: 98%    Weight: 95.3 kg (210 lb)    Height: 1.778 m (5' 10\")        Cardiac Rhythm: ,        Pain level: 0-10 Pain Scale: 5    Is this patient confused?: No   Does this patient have a guardian?  No         If yes, is there guardianship documents in the Epic \"Code/ACP\" activity?  N/A         Guardian Notified?  N/A  [place] - Suicide Severity Rating Scale Completed?  Yes  If yes, what color did the patient score?  White    Patient Report: Initial Complaint: Pt awoke at 0400 with upper abdominal pain and right sided back pain.  Focused Assessment: RUQ pain with palpation. Minimal nausea  Tests Performed: Labs, U/S  Abnormal Results:   Abnormal Labs Reviewed   COMPREHENSIVE METABOLIC PANEL - Abnormal; Notable for the following components:       Result Value    Chloride 110 (*)     Anion Gap 2 (*)     Glucose 121 (*)     All other components within normal limits   ROUTINE UA WITH MICROSCOPIC - Abnormal; Notable for the following components:    Mucous Urine Present (*)     All other components within normal limits     Treatments provided: Patient refused pain medicine. IV fluids and zofran    Family Comments: Wife at the bedside    OBS brochure/video discussed/provided to patient/family: yes              " Name of person given brochure if not patient: na              Relationship to patient: na    ED Medications:   Medications   0.9% sodium chloride BOLUS (0 mLs Intravenous Stopped 10/4/19 1002)     Followed by   sodium chloride 0.9% infusion (1,000 mLs Intravenous New Bag 10/4/19 1008)   HYDROmorphone (PF) (DILAUDID) injection 0.5 mg (0.5 mg Intravenous Not Given 10/4/19 8816)   ondansetron (ZOFRAN) injection 4 mg (4 mg Intravenous Given 10/4/19 5029)   piperacillin-tazobactam (ZOSYN) 3.375 g vial to attach to  mL bag (has no administration in time range)       Drips infusing?:  No    For the majority of the shift this patient was Green.   Interventions performed were na.    Severe Sepsis OR Septic Shock Diagnosis Present: No    To be done/followed up on inpatient unit:  Surgery    ED NURSE PHONE NUMBER: [phone]

## 2019-10-04 NOTE — ED PROVIDER NOTES
History     Chief Complaint:  Flank Pain and Abdominal Pain    The history is provided by the patient.      Juan Alberto Schultz is a 74 year old male who presents with flank pain and abdominal pain. The patient reports central upper abdominal pain and right flank pain that began this morning at around 0400 this morning which woke him up from sleep. He states that he has a motility study done at [facility] on October 2nd for esophageal spasms but he has not received the results yet. He is concerned that his pain could be related to this procedure. He has been uncomfortable since the procedure but was not in pain until this morning. He has been able to eat normal amounts since the procedure. He has a normal bowel movement this morning that was firm. Additionally with concern for the right flank pain, he states that the pain goes from his right shoulder blade and down the right side of his back. He has a history of kidney stones and states that it feels similar to when he has had them before. Nothing seems to make the pain worse and he has a tough time getting comfortable because of the pain.     Allergies:  Antihistamines, Chlorpheniramine-type  Valium     Medications:    Aspirin  Lipitor  Lisinopril    Past Medical History:    Allergic state  Migraines  Umbilical hernia  Kidney stones    Past Surgical History:    Orthopedic surgery    Family History:    History reviewed. No pertinent family history.    Social History:  Patient is   Tobacco Use: No  Alcohol Use: No  PCP: Reji Rain     Review of Systems   Constitutional: Negative for fever.   Gastrointestinal: Positive for abdominal pain. Negative for constipation and diarrhea.   Genitourinary: Positive for flank pain.   All other systems reviewed and are negative.    Physical Exam   First Vitals:  Patient Vitals for the past 24 hrs:   BP Temp Temp src Pulse Heart Rate Resp SpO2 Height Weight   10/04/19 1006 (!) 166/92 -- -- 71 -- 16 -- -- --   10/04/19 0713 (!)  "181/99 97.1  F (36.2  C) Oral 76 76 20 98 % 1.778 m (5' 10\") 95.3 kg (210 lb)     Physical Exam  Eye:  Pupils are equal, round, and reactive.  Extraocular movements intact.    ENT:  No rhinorrhea.  Moist mucus membranes.  Normal tongue and tonsil.    Cardiac:  Regular rate and rhythm.  No murmurs, gallops, or rubs.    Pulmonary:  Clear to auscultation bilaterally.  No wheezes, rales, or rhonchi.    Abdomen:  Focal tenderness in the right upper quadrant with positive hernandez's sign. Despite complaints of flank pain, no right CVA tenderness.     Musculoskeletal:  Normal movement of all extremities without evidence for deficit.    Skin:  Warm and dry without rashes.    Neurologic:  Non-focal exam without asymmetric weakness or numbness.     Psychiatric:  Normal affect with appropriate interaction with examiner.    Emergency Department Course   ECG:  @ 1025  Indication: Medical evaluation  Vent. Rate 66 bpm. TN interval 172 ms. QRS duration 92 ms. QT/QTc 408/427 ms. P-R-T axis 41 16 22.   Normal sinus rhythm. Normal ECG.    Read @ 1029 by Dr. Trierweiler.    Imaging:  Radiographic findings were communicated with the patient and family who voiced understanding of the findings.  US abdomen limited (RUQ only):  Multiple gallstones. Although no gallbladder wall  thickening or pericholecystic fluid is present, presence of positive  sonographic Hernandez's sign is concerning for acute cholecystitis. Per radiology read.    Laboratory:  CBC:  WBC 5.6, HGB 15.5,   CMP: Chloride 110 high, Anion Gap 2 low, Glucose 121 high, o/w WNL. (Creatinine 0.82)  Lipase: 135  Troponin: <0.015  UA: Mucous present (A), o/w WNL    Interventions:  0757: NS 1L IV  0759: Zofran 4mg IV  1008: NS 1L IV  1021: Zosyn 3.375g IV    Emergency Department Course:  7:08 AM Nursing notes and vitals reviewed.  I performed an exam of the patient as documented above.     8:16 AM I rechecked on and updated the patient.    10:03 AM I rechecked on and updated " the patient.    Findings and plan explained to the patient who consents to admission.   10:20 AM I discussed the patient with Dr. Gasca of the hospitalist service, who will admit the patient to an observational bed for further monitoring, evaluation, and treatment.    11:39 AM I consulted with Dr. Ramey of general surgery regarding the patient and his PA is present to evaluate the patient. Dr. Ramey will bring the patient to the OR.     Impression & Plan      Medical Decision Making:  This previously healthy 74-year-old gentleman presents with epigastric pain with radiation to his right flank.  This began at around 4 in the morning, waking from sleep.  He denies ever having symptoms similar to this in the past.    Exam shows a positive Hernandez sign.  Labs are reassuring.  Ultrasound does show multiple stones.  The patient felt improved after above interventions, but continued with pain and continued to show significant tenderness in the right upper quadrant.  With this, acute cholecystitis was felt most likely.  I initially spoke with Dr. Gasca of the hospitalist service who admit the patient and to consider preop evaluation.  A dose of Zosyn was provided in the emergency department.  I then spoke with Dr. Ramey of the general surgery team who plans to take the patient to the OR for cholecystectomy later today.  The patient's questions were answered he is comfortable with the plan for admission.    Diagnosis:    ICD-10-CM    1. Acute cholecystitis K81.0        Disposition:  Admitted to the hospitalist    I, Bradley Aasen, am serving as a scribe on 10/4/2019 at 7:37 AM to personally document services performed by Trierweiler, Chad A, MD based on my observations and the provider's statements to me.        Trierweiler, Chad A, MD  10/05/19 0706

## 2019-10-04 NOTE — ANESTHESIA POSTPROCEDURE EVALUATION
Patient: Juan Alberto Schultz    Procedure(s):  CHOLECYSTECTOMY, LAPAROSCOPIC    Diagnosis:Acute cholecystitis [K81.0]  Diagnosis Additional Information: No value filed.    Anesthesia Type:  General, ETT, RSI    Note:  Anesthesia Post Evaluation    Patient location during evaluation: PACU  Patient participation: Able to fully participate in evaluation  Level of consciousness: awake  Pain management: adequate  Airway patency: patent  Cardiovascular status: acceptable  Respiratory status: acceptable  Hydration status: acceptable  PONV: none     Anesthetic complications: None          Last vitals:  Vitals:    10/04/19 1550 10/04/19 1628 10/04/19 1645   BP: (!) 155/90 (!) 174/93 (!) 168/83   Pulse: 74     Resp: 16 16 18   Temp: 36.7  C (98  F)     SpO2: 95% 96% 94%         Electronically Signed By: Pelon Sánchez MD  October 4, 2019  6:01 PM

## 2019-10-04 NOTE — H&P
[facility]    History and Physical  Hospitalist       Date of Admission:  10/4/2019    Assessment & Plan   Juan Alberto Schultz is a 74 year old male with a history of hypertension presents with abdominal pain, possible acute cholecystitis with cholelithiasis.  Active Problems:    Acute cholecystitis  Acute cholelithiasis  --- Admit to observation  ---Consult general surgery  ---We will start on IV Rocephin  ---Keep him n.p.o., continue IV fluids, IV pain medications ordered.    Essential hypertension  --- Hold hydrochlorothiazide, continue lisinopril as possible    Hyperlipidemia LDL goal <100  --- Hold statins for now PTa    Generalized anxiety disorder  --- Will order PRN Ativan.        DVT Prophylaxis: Pneumatic Compression Devices  Code Status: Full Code    Disposition: Expected discharge in 24 hours     Mary Gasca MD    Primary Care Physician   Reji Rain    Chief Complaint   Abdominal pain 1 day     History is obtained from the patient    History of Present Illness   Juan Alberto Schultz is a 74 year old male with a history of hypertension, allergies, anxiety disorder presents to the emergency department with the complaints of abdominal pain.  His complaint started on the day of admission at 4 AM, he woke up from his sleep with epigastric abdominal pain which later switched to his right upper quadrant, he had some associated nausea, no vomiting, he was afebrile.  No melena or hematuria noted, no dysphagia, he was afebrile.  He waited at home for a while for the pain to subside, the pain got more intense and he felt more nauseated ,he would rank his pain as 10 out of 10 so he presented to the emergency department.    BUN within normal limits, lipase 135 AST and ALT within normal limits glucose 121, white count within normal limits,UA is normal.  Ultrasound of the abdomen showed Multiple gallstones. Although no gallbladder wall thickening or pericholecystic fluid is present, presence of  positive  sonographic Hernandez's sign is concerning for acute cholecystitis.  He was started on IV antibiotics, he received a dose of Zosyn and admission was requested for surgical evaluation.  Past Medical History    I have reviewed this patient's medical history and updated it with pertinent information if needed.   Past Medical History:   Diagnosis Date     Allergic state      Migraines      Umbilical hernia        Past Surgical History   I have reviewed this patient's surgical history and updated it with pertinent information if needed.  Past Surgical History:   Procedure Laterality Date     ORTHOPEDIC SURGERY         Prior to Admission Medications   Prior to Admission Medications   Prescriptions Last Dose Informant Patient Reported? Taking?   LORazepam (ATIVAN) 1 MG tablet   No No   Sig: Take 1 tablet (1 mg) by mouth daily as needed for anxiety or muscle spasms   aspirin (ASA) 81 MG chewable tablet   Yes No   Sig: Take 81 mg by mouth daily   atorvastatin (LIPITOR) 40 MG tablet   Yes No   Sig: Take 40 mg by mouth daily   lisinopril (PRINIVIL/ZESTRIL) 40 MG tablet   Yes No   Sig: Take 40 mg by mouth daily   triamterene-HCTZ (DYAZIDE) 37.5-25 MG capsule   No No   Sig: Take 1 capsule by mouth every morning      Facility-Administered Medications: None     Allergies   Allergies   Allergen Reactions     Antihistamines, Chlorpheniramine-Type [Alkylamines]      Valium [Diazepam]        Social History   I have reviewed this patient's social history and updated it with pertinent information if needed. Juan Alberto ARROYO Marion  reports that he has never smoked. He has never used smokeless tobacco. He reports that he does not drink alcohol or use drugs.    Family History   I have reviewed this patient's family history and updated it with pertinent information if needed.   No family history of colon cancer    Review of Systems   The 10 point Review of Systems is negative other than noted in the HPI or here.    Physical Exam   Temp: 97.1   F (36.2  C) Temp src: Oral BP: (!) 166/92 Pulse: 71 Heart Rate: 76 Resp: 16 SpO2: 98 % O2 Device: None (Room air)    Vital Signs with Ranges  Temp:  [97.1  F (36.2  C)] 97.1  F (36.2  C)  Pulse:  [71-76] 71  Heart Rate:  [76] 76  Resp:  [16-20] 16  BP: (166-181)/(92-99) 166/92  SpO2:  [98 %] 98 %  210 lbs 0 oz    Constitutional: Awake, alert, cooperative, no apparent distress.  Eyes: Conjunctiva and pupils examined and normal.  HEENT: Moist mucous membranes, normal dentition.  Respiratory: Clear to auscultation bilaterally, no crackles or wheezing.  Cardiovascular: Regular rate and rhythm, normal S1 and S2, and no murmur noted.  GI: Soft, non-distended, right upper quadrant tenderness noted, bowel sounds present  Lymph/Hematologic: No anterior cervical or supraclavicular adenopathy.  Skin: No rashes, no cyanosis, no edema.  Musculoskeletal: No joint swelling, erythema or tenderness.  Neurologic: Cranial nerves 2-12 intact, normal strength and sensation.  Psychiatric: Alert, oriented to person, place and time, no obvious anxiety or depression.    Data   Data reviewed today:  I personally reviewed EKG results, EKG ordered-sinus rhythm  Recent Labs   Lab 10/04/19  0730   WBC 5.6   HGB 15.5   MCV 92         POTASSIUM 4.0   CHLORIDE 110*   CO2 28   BUN 16   CR 0.82   ANIONGAP 2*   ELIJAH 8.8   *   ALBUMIN 3.6   PROTTOTAL 7.3   BILITOTAL 0.7   ALKPHOS 92   ALT 33   AST 21   LIPASE 135   TROPI <0.015       Imaging:  Recent Results (from the past 24 hour(s))   Abdomen US, limited (RUQ only)    Narrative    ULTRASOUND ABDOMEN LIMITED   10/4/2019 9:25 AM     HISTORY: Right upper quadrant pain, history of kidney stones as well.  Evaluate for right kidney hydronephrosis.    COMPARISON: None available    FINDINGS:    Gallbladder: Multiple mobile, shadowing stones are present. No  gallbladder wall thickening or pericholecystic fluid is present.  Positive sonographic Hernandez's sign is present.    Bile ducts: CHD  is normal diameter. No intrahepatic biliary  dilatation.    Liver: Right hepatic lobe appears unremarkable. Left hepatic lobe is  obscured by bowel gas.    Pancreas: Completely obscured by overlying bowel gas.     Right kidney: Normal.     Aorta and IVC: Not specifically assessed.      Impression    IMPRESSION: Multiple gallstones. Although no gallbladder wall  thickening or pericholecystic fluid is present, presence of positive  sonographic Hernandez's sign is concerning for acute cholecystitis.    SHIRAZ DALAL MD

## 2019-10-04 NOTE — ANESTHESIA PREPROCEDURE EVALUATION
Anesthesia Pre-Procedure Evaluation    Patient: Juan Alberto Schultz   MRN: [id number] : 1945          Preoperative Diagnosis: Acute cholecystitis [K81.0]    Procedure(s):  CHOLECYSTECTOMY, LAPAROSCOPIC    Past Medical History:   Diagnosis Date     Allergic state      Migraines      Umbilical hernia      Past Surgical History:   Procedure Laterality Date     ORTHOPEDIC SURGERY         Anesthesia Evaluation     . Pt has had prior anesthetic.     No history of anesthetic complications          ROS/MED HX    ENT/Pulmonary:      (-) tobacco use, asthma and sleep apnea   Neurologic:       Cardiovascular:     (+) hypertension----. : . . . :. .       METS/Exercise Tolerance:     Hematologic:         Musculoskeletal:         GI/Hepatic:        (-) GERD   Renal/Genitourinary:         Endo:         Psychiatric:         Infectious Disease:         Malignancy:         Other:                          Physical Exam  Normal systems: dental    Airway   Mallampati: II  TM distance: >3 FB  Neck ROM: full    Dental     Cardiovascular   Rhythm and rate: regular and normal      Pulmonary    breath sounds clear to auscultation            Lab Results   Component Value Date    WBC 5.6 10/04/2019    HGB 15.5 10/04/2019    HCT 44.8 10/04/2019     10/04/2019     10/04/2019    POTASSIUM 4.0 10/04/2019    CHLORIDE 110 (H) 10/04/2019    CO2 28 10/04/2019    BUN 16 10/04/2019    CR 0.82 10/04/2019     (H) 10/04/2019    ELIJAH 8.8 10/04/2019    PHOS 3.1 2019    MAG 2.2 2019    ALBUMIN 3.6 10/04/2019    PROTTOTAL 7.3 10/04/2019    ALT 33 10/04/2019    AST 21 10/04/2019    ALKPHOS 92 10/04/2019    BILITOTAL 0.7 10/04/2019    LIPASE 135 10/04/2019    PTT 33 10/03/2018    INR 1.01 10/03/2018       Preop Vitals  BP Readings from Last 3 Encounters:   10/04/19 (!) 170/95   19 124/75   19 148/82    Pulse Readings from Last 3 Encounters:   10/04/19 78   19 71      Resp Readings from Last 3 Encounters:  "  10/04/19 16   07/31/19 23   07/27/19 17    SpO2 Readings from Last 3 Encounters:   10/04/19 98%   07/31/19 93%   07/27/19 95%      Temp Readings from Last 1 Encounters:   10/04/19 36.2  C (97.1  F) (Oral)    Ht Readings from Last 1 Encounters:   10/04/19 1.778 m (5' 10\")      Wt Readings from Last 1 Encounters:   10/04/19 95.3 kg (210 lb)    Estimated body mass index is 30.13 kg/m  as calculated from the following:    Height as of this encounter: 1.778 m (5' 10\").    Weight as of this encounter: 95.3 kg (210 lb).       Anesthesia Plan      History & Physical Review  History and physical reviewed and following examination; no interval change.    ASA Status:  2 .        Plan for General and ETT with Intravenous induction. Maintenance will be Balanced.      Additional equipment: Videolaryngoscope Patient wants minimal different medications.  He is okay with propofol, fentanyl rogelio/sux, sevorlurane.  He does not want anti emitic.  He does not want any medications that are not \"necessary\" He does not respond well to most medications      Postoperative Care  Postoperative pain management:  IV analgesics.      Consents  Anesthetic plan, risks, benefits and alternatives discussed with:  Patient..                 Christy Puckett  "

## 2019-10-04 NOTE — ED TRIAGE NOTES
Patient presents with complaints of upper abdominal pain and right flank pain that began about 0400 this morning. Patient had a motility test at [facility] and thinks this has something to do with it.

## 2019-10-04 NOTE — CONSULTS
General Surgery Consultation    Juan Alberto Schultz MRN#: [id number]   Age: 74 year old YOB: 1945     Date of Admission:  10/4/2019  Reason for consult: Cholelithiasis       Requesting physician: Mary Gasca MD       Surgeon:        Juan Alberto Ramey        Chief Complaint:   Acute cholecystitis [K81.0]     History is obtained from the patient and chart review         History of Present Illness:   General Surgery was asked to evaluate this patient at the request of Dr. Mary Gasca for Acute Cholecystitis with cholelithiasis.    This patient is a 74 year old  male with a significant past medical history of hyperlipidemia and hypertension who presents with Abdominal pain.    He has been getting worked up by [facility] (Sacha Duffy PA-C) for esophageal issues.  He has struggled with reflux type symptoms and dysmotility.  He had some motility testing on Wednesday and had plans for upper endoscopy today but this was canceled because he was in the ED.    His abdominal pain started in the epigastric area last night.  Went to sleep and woke with more epigastric and flank pain about 0400 this am.  He also notes some right sided back pain under the shoulder blade that radiates inferiorly as well.  He has had kidney stones in the past.    In the ED, he was worked up for his abdominal pain with abdominal US.  This showed gallstones and a positive sonographic Hernandez sign.  There was no gallbladder wall thickening.  His LFTs are WNL; WBC WNL.          Past Medical History:   Juan Alberto Schultz  has a past medical history of Allergic state, Migraines, and Umbilical hernia.          Past Surgical History:     Past Surgical History:   Procedure Laterality Date     ORTHOPEDIC SURGERY     Left knee.  Reports his anesthetic did not work as he could lift his leg.  This sounds like it was a knee arthroscopy.  Had a small surgery to remove a Right breast mass during puberty as well which he believes was done with local.           "Social History:     Social History     Tobacco Use     Smoking status: Never Smoker     Smokeless tobacco: Never Used   Substance Use Topics     Alcohol use: No             Family History:   This patient has two relatives (daughter and ?brother) who have had gallbladders removed in the last few months.          Allergies:     Allergies   Allergen Reactions     Antihistamines, Chlorpheniramine-Type [Alkylamines]      Valium [Diazepam]              Medications:     Prior to Admission medications    Medication Sig Start Date End Date Taking? Authorizing Provider   aspirin (ASA) 81 MG chewable tablet Take 81 mg by mouth daily   Yes Reported, Patient   atorvastatin (LIPITOR) 40 MG tablet Take 40 mg by mouth daily   Yes Reported, Patient   ibuprofen (ADVIL/MOTRIN) 200 MG tablet Take 200 mg by mouth every 8 hours as needed for mild pain   Yes Unknown, Entered By History   lisinopril (PRINIVIL/ZESTRIL) 40 MG tablet Take 20 mg by mouth daily    Yes Reported, Patient             Review of Systems:   The Review of Systems is negative other than noted in the HPI          Physical Exam:   Blood pressure (!) 170/95, pulse 78, temperature 97.1  F (36.2  C), temperature source Oral, resp. rate 16, height 1.778 m (5' 10\"), weight 95.3 kg (210 lb), SpO2 98 %.    General - This is a well developed, well nourished male in no apparent distress.  HEENT - Normocephalic. Atraumatic. Moist mucous membranes. Pupils equal.  No scleral icterus.  Neck - Supple without masses.  Trachea midline  Lungs - Clear to ascultation bilaterally.    Heart - Regular rate & rhythm without murmur.  Abdomen - Soft, nontender, nondistended with +bowel sounds. Umbilical hernia   Reports RUQ pain much improved.  Neg Hernandez sign.  No rebound tenderness.   Extremities - Moves all extremities. Warm without edema.  Neurologic - Nonfocal.          Data:   Labs:  WBC -   WBC   Date Value Ref Range Status   10/04/2019 5.6 4.0 - 11.0 10e9/L Final     Hgb -   Hemoglobin "   Date Value Ref Range Status   10/04/2019 15.5 13.3 - 17.7 g/dL Final       Liver Function Studies -   Recent Labs   Lab Test 10/04/19  0730   PROTTOTAL 7.3   ALBUMIN 3.6   BILITOTAL 0.7   ALKPHOS 92   AST 21   ALT 33       CT scan of the abdomen: None      Ultrasound of the abdomen:  IMPRESSION: Multiple gallstones. Although no gallbladder wall  thickening or pericholecystic fluid is present, presence of positive  sonographic Hernandez's sign is concerning for acute cholecystitis.    EKG:   Complete; See Chart         Assessment:   Acute Cholecystitis with Cholelithiasis         Plan:   Attack is improved.  Discussed options of surgery now vs nonsurgical options.  Gallstones will not go away.  Eventually would benefit from surgery.  He would like to proceed with this now.  Keep NPO.  Last thing to eat was yesterday; drank some water at 0200.  Continue Zosyn  To OR for Lap Nahomy this afternoon.  Possibly home this evening vs tomorrow pending surgical findings and how he does post operatively.  Hospitalist team primary.  Appreciate this consult.  After discharge will follow up with phone call at 2 weeks; Appt PRN.    Rolan Bhatt PA-C, physician assistant for Juan Alberto Ramey  [facility], [phone]  Pager [phone]

## 2019-10-04 NOTE — ANESTHESIA CARE TRANSFER NOTE
Patient: Juan Alberto Schultz    Procedure(s):  CHOLECYSTECTOMY, LAPAROSCOPIC    Diagnosis: Acute cholecystitis [K81.0]  Diagnosis Additional Information: No value filed.    Anesthesia Type:   General, ETT, RSI     Note:  Airway :Face Mask and Oral Airway  Patient transferred to:PACU  Comments: Pt to PACU with O2 via mask, oral airway in place, airway patent, VSS.  Report to RN.Handoff Report: Identifed the Patient, Identified the Reponsible Provider, Reviewed the pertinent medical history, Discussed the surgical course, Reviewed Intra-OP anesthesia mangement and issues during anesthesia, Set expectations for post-procedure period and Allowed opportunity for questions and acknowledgement of understanding      Vitals: (Last set prior to Anesthesia Care Transfer)    CRNA VITALS  10/4/2019 1358 - 10/4/2019 1434      10/4/2019             NIBP:  111/69    Pulse:  73    NIBP Mean:  86    SpO2:  96 %    Resp Rate (set):  10                Electronically Signed By: ULYSSES Peacock CRNA  October 4, 2019  2:34 PM

## 2019-10-04 NOTE — DISCHARGE INSTRUCTIONS
[facility] - [facility]  Discharge Instructions: Post-Operative Laparoscopic Cholecystectomy    ACTIVITY    Expect to feel tired after your surgery.  This will gradually resolve.      Take frequent, short walks and increase your activity gradually.      Avoid strenuous physical activity or heavy lifting greater than 15-20 lbs. for 2-3 weeks.  You may climb stairs.    You may drive without restrictions when you are not using any prescription pain medication and feel comfortable in a car.    You may return to work/school when you are comfortable without any prescription pain medication.    WOUND CARE    You may remove your outer dressing or Band-Aids and shower 48 hours after the surgery.  Pat your incisions dry and leave them open to air.  Re-apply dressing (Band-Aids or gauze/tape) as needed for comfort or drainage.    You may have steri-strips (looks like white tape) on your incision.  You may peel off the steri-strips 2 weeks after your surgery if they have not peeled off on their own.     Do not soak your incisions in a tub or pool for 2 weeks.     Do not apply any lotions, creams, or ointments to your incisions.    A ridge under your incisions is normal and will gradually resolve.    DIET    Start with liquids, then gradually resume your regular diet as tolerated.  Avoid heavy, spicy, and greasy meals for 2-3 days.    Drink plenty of fluids to stay hydrated.    It is not uncommon to experience some loose stools or diarrhea after surgery.  This is your body s way of adapting to the bile which will slowly drain into your intestine.  A low fat diet may help with this.  This should improve over 1-2 months.    PAIN    Expect some tenderness and discomfort at the incision sites.  Use the prescribed pain medication at your discretion.  Expect gradual resolution of your pain over several days.    You may take ibuprofen with food (unless you have been told not to) instead of or in addition to  your prescribed pain medication.  If you are taking Norco or Percocet, do not take any additional acetaminophen/APAP/Tylenol.    Do not drink alcohol or drive while you are taking pain medications.    You may apply ice to your incisions in 20 minute intervals as needed for the next 48 hours.  After that time, consider switching to heat if you prefer.    EXPECTATIONS    Pain medications can cause constipation.  Limit use when possible.  Take over the counter stool softener/stimulant, such as Colace or Senna, 1-2 times a day with plenty of water.  You may take a mild over the counter laxative, such as Miralax or a suppository, as needed.  You may discontinue these medications once you are having regular bowel movements and/or are no longer taking your narcotic pain medication.      You may have shoulder or upper back discomfort due to the gas used in surgery.  This is temporary and should resolve in 48-72 hours.  Short, frequent walks may help with this.    FOLLOW UP    Our office will contact you approximately 2 weeks to check on your progress and answer any questions you may have.  If you are doing well, you will not need to return for a follow up appointment.  If any concerns are identified over the phone, we will help you make an appointment to see a provider.     If you have not received a phone call, have any questions or concerns, or would like to be seen, please call us at [phone] and ask to speak with our nurse.  We are located at [address] [place] [address].    CALL OUR OFFICE -[phone] IF YOU HAVE:     Chills or fever above 101 F.    Increased redness, warmth, or drainage at your incisions.    Significant bleeding.    Pain not relieved by your pain medication or rest.    Increasing pain after the first 48 hours.    Any other concerns or questions.    Revised January 2018

## 2019-10-04 NOTE — OP NOTE
General Surgery Operative Note    PREOPERATIVE DIAGNOSIS:  Acute cholecystitis [K81.0], gallstones    POSTOPERATIVE DIAGNOSIS:  Same    PROCEDURE:   Procedure(s):  CHOLECYSTECTOMY, LAPAROSCOPIC    ANESTHESIA:  General.      SURGEON:  Juan Alberto Ramey MD    ASSISTANT:  Juan C Ramirez MD. The physician was medically necessary for their expertise in camera management, suctioning, and retraction    INDICATIONS:   The patient has marked abdominal pain and gallstones.  The risks, including but not limited to bleeding, infection, bile duct or bowel injury, anesthesia, and the possible need for an open approach were reviewed. The patient appeared to understand and wished to proceed with operation.    PROCEDURE:  The patient was taken to the operating suite.  The operative area was prepped and draped in a sterile fashion.  Surgeon initiated timeout was acknowledged.      Under general anesthesia the abdomen was insufflated through a periumbilical incision with a veress needle. Over 3 liters were place with low pressures. A 5mm trocar was placed. There was no injury seen when the camera was placed. Under direct vision two 5mm trocars were placed in the right upper quadrant and an 11mm trocar placed below the xiphoid. The gallbladder was drained of 50cc of clear bile. The gallbladder was grasped and adhesions taken down with blunt dissection and cautery. The peritoneal surfaces of the critical angle were cauterized posteriorly and anteriorly. The critical angle was dissected out, until there were only two structures remaining. Once these structures were identified, the duct was doubly clipped proximally, singly clipped distally and divided. The cystic artery was double clipped proximally, singly clipped distally and divided. The gallbladder was dissected off its bed with cautery from medial to lateral. The gallbladder was set aside and hemostasis assured on the liver. Irrigation was used and suctioned out. The bed was dry and the  clips were intact. The gallbladder was removed through the larger incision, which was enlarged as needed to permit passage of the gallbladder. We then irrigated again, and saw no sign of blood of bile leak. We checked for veress needle injury, there was none. The large trocar was removed and the fascia closed with 0 Vicryl. Marcaine was instilled. Gas was suctioned out. Trocars were removed. Sponge count was reported as correct. All incisions were closed with 4-0 Vicryl and steri-strips.            INTRAOPERATIVE FINDINGS:  Edematous cholecystitis    Juan Alberto Ramey MD

## 2019-10-05 VITALS
RESPIRATION RATE: 18 BRPM | HEIGHT: 70 IN | BODY MASS INDEX: 30.06 KG/M2 | HEART RATE: 74 BPM | TEMPERATURE: 97.2 F | DIASTOLIC BLOOD PRESSURE: 83 MMHG | OXYGEN SATURATION: 96 % | SYSTOLIC BLOOD PRESSURE: 143 MMHG | WEIGHT: 210 LBS

## 2019-10-05 LAB
ANION GAP SERPL CALCULATED.3IONS-SCNC: 5 MMOL/L (ref 3–14)
BUN SERPL-MCNC: 11 MG/DL (ref 7–30)
CALCIUM SERPL-MCNC: 9.3 MG/DL (ref 8.5–10.1)
CHLORIDE SERPL-SCNC: 109 MMOL/L (ref 94–109)
CO2 SERPL-SCNC: 24 MMOL/L (ref 20–32)
CREAT SERPL-MCNC: 0.82 MG/DL (ref 0.66–1.25)
ERYTHROCYTE [DISTWIDTH] IN BLOOD BY AUTOMATED COUNT: 13.8 % (ref 10–15)
GFR SERPL CREATININE-BSD FRML MDRD: 88 ML/MIN/{1.73_M2}
GLUCOSE SERPL-MCNC: 126 MG/DL (ref 70–99)
HCT VFR BLD AUTO: 45 % (ref 40–53)
HGB BLD-MCNC: 15.6 G/DL (ref 13.3–17.7)
MCH RBC QN AUTO: 31.6 PG (ref 26.5–33)
MCHC RBC AUTO-ENTMCNC: 34.7 G/DL (ref 31.5–36.5)
MCV RBC AUTO: 91 FL (ref 78–100)
PLATELET # BLD AUTO: 184 10E9/L (ref 150–450)
POTASSIUM SERPL-SCNC: 4.1 MMOL/L (ref 3.4–5.3)
RBC # BLD AUTO: 4.93 10E12/L (ref 4.4–5.9)
SODIUM SERPL-SCNC: 138 MMOL/L (ref 133–144)
WBC # BLD AUTO: 7.4 10E9/L (ref 4–11)

## 2019-10-05 PROCEDURE — 80048 BASIC METABOLIC PNL TOTAL CA: CPT | Performed by: INTERNAL MEDICINE

## 2019-10-05 PROCEDURE — 99217 ZZC OBSERVATION CARE DISCHARGE: CPT | Performed by: PHYSICIAN ASSISTANT

## 2019-10-05 PROCEDURE — G0378 HOSPITAL OBSERVATION PER HR: HCPCS

## 2019-10-05 PROCEDURE — 36415 COLL VENOUS BLD VENIPUNCTURE: CPT | Performed by: INTERNAL MEDICINE

## 2019-10-05 PROCEDURE — 85027 COMPLETE CBC AUTOMATED: CPT | Performed by: INTERNAL MEDICINE

## 2019-10-05 NOTE — PLAN OF CARE
DATE & TIME: 10/4/2019 [id number]    Cognitive Concerns/ Orientation : A+Ox4, anxious at times   BEHAVIOR & AGGRESSION TOOL COLOR: Green; calm and cooperative  CIWA SCORE: NA   ABNL VS/O2: 2L for comfort; capno  MOBILITY: standby  PAIN MANAGMENT: denies; does not want narcotics  DIET: Full liquid  BOWEL/BLADDER: Bowel sounds hypoactive; no gas, voiding well  ABNL LAB/BG: NA  DRAIN/DEVICES: None  TELEMETRY RHYTHM: NA  SKIN: Incisions x3 intact with steri strips  TESTS/PROCEDURES: None  D/C DAY/GOALS/PLACE: Discharge in morning; wants pics promised by surgeon  OTHER IMPORTANT INFO: Pictures not in chart; was told to ask MD tomorrow

## 2019-10-05 NOTE — PLAN OF CARE
Pt is discharging home at this time accompanied by pt's wife. AVS, meds and education given. Pt refused oxycodone which was already filled by Pharmacy. Oxycodone returned to Pharmacy.

## 2019-10-05 NOTE — DISCHARGE SUMMARY
[facility]  Hospitalist Discharge Summary       Date of Admission:  10/4/2019  Date of Discharge:  10/5/2019  Discharging Provider: Rachel Bone PA-C      Discharge Diagnoses   Cholecystitis status post laparoscopic cholecystectomy  Essential hypertension  Hyperlipidemia      Follow-ups Needed After Discharge   Follow-up Appointments     Follow-up and recommended labs and tests       Follow up with general surgery per their recommendations             Unresulted Labs Ordered in the Past 30 Days of this Admission     Date and Time Order Name Status Description    10/4/2019 1406 Surgical pathology exam In process         Discharge Disposition   Discharged to home  Condition at discharge: Stable    Hospital Course   HPI from H&P per Dr. Campos MD  Juan Alberto Schultz is a 74 year old male with a history of hypertension, allergies, anxiety disorder presents to the emergency department with the complaints of abdominal pain.  His complaint started on the day of admission at 4 AM, he woke up from his sleep with epigastric abdominal pain which later switched to his right upper quadrant, he had some associated nausea, no vomiting, he was afebrile.  No melena or hematuria noted, no dysphagia, he was afebrile.  He waited at home for a while for the pain to subside, the pain got more intense and he felt more nauseated ,he would rank his pain as 10 out of 10 so he presented to the emergency department.     BUN within normal limits, lipase 135 AST and ALT within normal limits glucose 121, white count within normal limits,UA is normal.  Ultrasound of the abdomen showed Multiple gallstones. Although no gallbladder wall thickening or pericholecystic fluid is present, presence of positive  sonographic Hernandez's sign is concerning for acute cholecystitis.  He was started on IV antibiotics, he received a dose of Zosyn and admission was requested for surgical evaluation.    Acute cholecystitis: Presented to the  emergency department with a 1 day history of epigastric abdominal pain that later transitioned to his right upper quadrant.  He had associated nausea.  In the emergency department no significant leukocytosis.  AST and ALT within normal limits.  Right upper quadrant ultrasound with multiple gallstones and possible positive sonographic Hernandez sign.  -Patient admitted to observation.  Underwent laparoscopic cholecystectomy 10/3.  Postoperatively pain was well controlled with Tylenol.  He elects to avoid narcotics of these have been discontinued on discharge  -Follow-up with general surgery per the recommendations    Essential hypertension: PTA regimen includes lisinopril 20 mg daily  -Continue PTA lisinopril at discharge    Hyperlipidemia  -Continue ASA and atorvastatin    Consultations This Hospital Stay   SURGERY GENERAL IP CONSULT    Code Status   Full Code    Time Spent on this Encounter   I, Rachel Bone PA-C, personally saw the patient today and spent greater than 30 minutes discharging this patient.       Rachel Bone PA-C  [facility]  ______________________________________________________________________    Physical Exam   Vital Signs: Temp: 97.2  F (36.2  C) Temp src: Oral BP: (!) 143/83 Pulse: 74 Heart Rate: 105 Resp: 18 SpO2: 96 % O2 Device: None (Room air) Oxygen Delivery: 1 LPM  Weight: 210 lbs 0 oz  Constitutional: Alert, resting comfortably in NAD  HEENT: Head normocephalic, atraumatic. Eyes sclera non icteric.   Respiratory: Normal effort, symmetric expansion, no crackles or wheezing  Cardiovascular: RRR no murmurs   GI: Non distended, normal bowels sounds, no tenderness or guarding.  Incisions clean dry and intact.  MSK: LE without edema. Dorsalis pedis pulse palpated bilaterally.   Skin/Integumen: Clear  Neuro: CN II-XII grossly intact  Psych:  Alert and oriented x 3. Normal affect         Primary Care Physician   Reji Rain    Discharge Orders      Reason for your  hospital stay    You were admitted for cholecystitis and underwent cholecystecomy     Follow-up and recommended labs and tests     Follow up with general surgery per their recommendations     Diet    Follow this diet upon discharge: Orders Placed This Encounter      Advance Diet as Tolerated: Regular Diet Adult; Regular Diet Adult       Significant Results and Procedures   Most Recent 3 CBC's:  Recent Labs   Lab Test 10/05/19  0621 10/04/19  0730 07/31/19  0519   WBC 7.4 5.6 6.4   HGB 15.6 15.5 15.0   MCV 91 92 91    181 164     Most Recent 2 LFT's:  Recent Labs   Lab Test 10/04/19  0730 07/31/19  0519   AST 21 28   ALT 33 35   ALKPHOS 92 92   BILITOTAL 0.7 0.8   ,   Results for orders placed or performed during the hospital encounter of 10/04/19   Abdomen US, limited (RUQ only)    Narrative    ULTRASOUND ABDOMEN LIMITED   10/4/2019 9:25 AM     HISTORY: Right upper quadrant pain, history of kidney stones as well.  Evaluate for right kidney hydronephrosis.    COMPARISON: None available    FINDINGS:    Gallbladder: Multiple mobile, shadowing stones are present. No  gallbladder wall thickening or pericholecystic fluid is present.  Positive sonographic Hernandez's sign is present.    Bile ducts: CHD is normal diameter. No intrahepatic biliary  dilatation.    Liver: Right hepatic lobe appears unremarkable. Left hepatic lobe is  obscured by bowel gas.    Pancreas: Completely obscured by overlying bowel gas.     Right kidney: Normal.     Aorta and IVC: Not specifically assessed.      Impression    IMPRESSION: Multiple gallstones. Although no gallbladder wall  thickening or pericholecystic fluid is present, presence of positive  sonographic Hernandez's sign is concerning for acute cholecystitis.    SHIRAZ DALAL MD       Discharge Medications   Current Discharge Medication List      START taking these medications    Details   acetaminophen (TYLENOL) 325 MG tablet Take 2 tablets (650 mg) by mouth every 4 hours as needed  for other (mild pain)  Qty: 100 tablet, Refills: 0    Associated Diagnoses: Acute cholecystitis      senna-docusate (SENOKOT-S/PERICOLACE) 8.6-50 MG tablet Take 1-2 tablets by mouth 2 times daily Take while on oral narcotics to prevent or treat constipation.  Qty: 30 tablet, Refills: 0    Comments: While on narcotics  Associated Diagnoses: Acute cholecystitis         CONTINUE these medications which have NOT CHANGED    Details   aspirin (ASA) 81 MG chewable tablet Take 81 mg by mouth daily      atorvastatin (LIPITOR) 40 MG tablet Take 40 mg by mouth daily      lisinopril (PRINIVIL/ZESTRIL) 40 MG tablet Take 20 mg by mouth daily          STOP taking these medications       ibuprofen (ADVIL/MOTRIN) 200 MG tablet Comments:   Reason for Stopping:             Allergies   Allergies   Allergen Reactions     Antihistamines, Chlorpheniramine-Type [Alkylamines]      Valium [Diazepam]

## 2019-10-05 NOTE — PLAN OF CARE
POD1 laparoscopic cholecystectomy. A/Ox4. HTN, other VSS on room air. 4 lap sites CDI, covered with steri-strips and bandaids. Complains of 4/10 abdominal/incisional, back, R shoulder, and headache pain. Declined pain medicine and ice. Abdomen soft/nontender ex incision sites. CMS intact. Voiding WDL. Diet advanced to regular. IV S/L. Up with SBA. Plan to discharge this morning. Pt already has discharge meds in bin, pt would like to see pictures from surgeon before leaving today. Encouraged to call with questions/needs/uncontrolled pain. Continue to monitor.

## 2019-10-05 NOTE — PROGRESS NOTES
Outpatient in Obs Bed Goals:  Patient able to ambulate as they were prior to admission or with assist devices provided by therapies during their stay: met

## 2019-10-06 NOTE — ADDENDUM NOTE
Addendum  created 10/06/19 1852 by Pelon Sánchez MD    Attestation recorded in Intraprocedure, Intraprocedure Attestations filed

## 2019-10-08 LAB — COPATH REPORT: NORMAL

## 2019-10-09 ENCOUNTER — HOSPITAL LABORATORY (OUTPATIENT)
Dept: OTHER | Facility: CLINIC | Age: 74
End: 2019-10-09

## 2019-10-09 LAB
APPEARANCE FLD: NORMAL
COLOR FLD: YELLOW
CRYSTALS SNV MICRO: NORMAL
GRAM STN SPEC: NORMAL
MONOS+MACROS NFR FLD MANUAL: 7 %
NEUTS BAND NFR FLD MANUAL: 93 %
SPECIMEN SOURCE FLD: NORMAL
SPECIMEN SOURCE SNV: NORMAL
SPECIMEN SOURCE: NORMAL
WBC # FLD AUTO: NORMAL /UL

## 2019-10-14 LAB
BACTERIA SPEC CULT: NO GROWTH
SPECIMEN SOURCE: NORMAL

## 2019-10-23 LAB
BACTERIA SPEC CULT: NORMAL
Lab: NORMAL
SPECIMEN SOURCE: NORMAL

## 2019-11-04 ENCOUNTER — TRANSFERRED RECORDS (OUTPATIENT)
Dept: HEALTH INFORMATION MANAGEMENT | Facility: CLINIC | Age: 74
End: 2019-11-04

## 2019-11-15 ENCOUNTER — MEDICAL CORRESPONDENCE (OUTPATIENT)
Dept: HEALTH INFORMATION MANAGEMENT | Facility: CLINIC | Age: 74
End: 2019-11-15

## 2019-11-15 ENCOUNTER — TRANSFERRED RECORDS (OUTPATIENT)
Dept: HEALTH INFORMATION MANAGEMENT | Facility: CLINIC | Age: 74
End: 2019-11-15

## 2019-11-19 NOTE — TELEPHONE ENCOUNTER
FUTURE VISIT INFORMATION      FUTURE VISIT INFORMATION:    Date: 1/6/20    Time: 11:20 AM    Location: [facility] [place]  REFERRAL INFORMATION:    Referring provider:  TEENA Espinoza    Referring providers clinic:  [facility]    Reason for visit/diagnosis  Throat Pain and globus sensation    RECORDS REQUESTED FROM:       Clinic name Comments Records Status Imaging Status   [facility] 11/15/19 - Referral and OV from TEENA Espinoza  11/4/19 - Upper GI Endoscopy Biopsy Results  10/2/19 - Upper GI Endoscopy  9/6/19 - OV with TEENA Espinoza Scanned In    [facility] - ED 7/31/19 - ED OV with Dr. Blas  10/3/18 - ED OV with Dr. James Epic    [facility] - Imaging 10/3/18 - CTA Head/Neck W [facility] PACs                       * 11/19/19 9:35 AM Faxed Request to [facility] for records pertaining to throat - [place]y

## 2019-12-06 ENCOUNTER — DOCUMENTATION ONLY (OUTPATIENT)
Dept: CARE COORDINATION | Facility: CLINIC | Age: 74
End: 2019-12-06

## 2020-01-06 ENCOUNTER — PRE VISIT (OUTPATIENT)
Dept: OTOLARYNGOLOGY | Facility: CLINIC | Age: 75
End: 2020-01-06

## 2020-01-06 ENCOUNTER — OFFICE VISIT (OUTPATIENT)
Dept: OTOLARYNGOLOGY | Facility: CLINIC | Age: 75
End: 2020-01-06
Payer: MEDICARE

## 2020-01-06 VITALS — WEIGHT: 214 LBS | HEIGHT: 70 IN | BODY MASS INDEX: 30.64 KG/M2

## 2020-01-06 DIAGNOSIS — J38.3 VOCAL FOLD DYSFUNCTION: ICD-10-CM

## 2020-01-06 DIAGNOSIS — H90.3 SENSORINEURAL HEARING LOSS, BILATERAL: ICD-10-CM

## 2020-01-06 DIAGNOSIS — F41.9 ANXIETY: ICD-10-CM

## 2020-01-06 DIAGNOSIS — J38.7 IRRITABLE LARYNX: ICD-10-CM

## 2020-01-06 DIAGNOSIS — J38.7 IRRITABLE LARYNX: Primary | ICD-10-CM

## 2020-01-06 DIAGNOSIS — R09.A2 GLOBUS SENSATION: Primary | ICD-10-CM

## 2020-01-06 DIAGNOSIS — R49.0 DYSPHONIA: ICD-10-CM

## 2020-01-06 ASSESSMENT — PAIN SCALES - GENERAL: PAINLEVEL: NO PAIN (1)

## 2020-01-06 ASSESSMENT — MIFFLIN-ST. JEOR: SCORE: 1726.95

## 2020-01-06 NOTE — LETTER
1/6/2020       RE: Juan Alberto Schultz  [address]  [facility] [address]     Dear Colleague,    Thank you for referring your patient, Juan Alberto Schultz, to the [facility] VOICE at [facility] Please see a copy of my visit note below.    [facility] VOICE CLINIC  Evaluation report    Clinician: Marvin Person M.M., M.A., CCC/SLP  Seen in conjunction with: Dr. Cortez  Referring physician:  Dr. Duffy  Patient: Juan Alberto Schultz  Date of Visit: 1/6/2020    HISTORY  Chief complaint: Juan Alberto Schultz is a 74 year old gentleman presenting today for evaluation of throat spasm.    Salient history: He has a 15 + year history of episodic tightness in his chest and throat which he self diagnosed as esophageal spasms.  These can have many triggers, but can be caused by eating stringy foods or drinking large amounts of liquid.  Symptoms begin with a sensation of tightness and evolve into increased mucus which he has to expectorate and then subsequent breathing difficulties. He underwent esophageal manometry which demonstrated ineffective motility with siimultaneous contraction, but no spasm.  Distal esophageal biopsies demonstrated some reflux changes, and PPI was recommended though suspicion was low for this being primarily causal.  Empiric dilation was performed to 54 Turkish.  Despite these measures he experienced no resolution of symptoms.  It was Dr. Duffy's feeling that his his symptoms were more laryngeal pharyngeal in nature, and referral to our clinic for further evaluation was recommended.  He presents for this today.    OTHER PERTINENT HISTORY    Otherwise unknown.  Please also refer to Dr. Cortez's dictation.     In response to ankle swelling he decided to take his wife's diuretic.    Past Medical History:   Diagnosis Date     Allergic state      Migraines      Umbilical hernia      Past Surgical History:   Procedure Laterality Date     LAPAROSCOPIC CHOLECYSTECTOMY N/A 10/4/2019    Procedure:  "CHOLECYSTECTOMY, LAPAROSCOPIC;  Surgeon: Juan Alberto Ramey MD;  Location: [facility] OR     ORTHOPEDIC SURGERY       OBJECTIVE  PATIENT REPORTED MEASURES  Patient Supplied Answers To VHI Questionnaire  Voice Handicap Index (VHI-10) 1/6/2020   My voice makes it difficult for people to hear me 2   People have difficulty understanding me in a noisy room 2   My voice difficulties restrict my personal and social life.  1   I feel left out of conversations because of my voice 2   My voice problem causes me to lose income 0   I feel as though I have to strain to produce voice 2   The clarity of my voice is unpredictable 2   My voice problem upsets me 2   My voice makes me feel handicapped 1   People ask, \"What's wrong with your voice?\" 2   VHI-10 16     Patient Supplied Answers To CSI Questionnaire  Cough Severity Index (CSI) 1/6/2020   My cough is worse when I lie down 0   My coughing problem causes me to restrict my personal and social life 0   I tend to avoid places because of my cough problem 0   I feel embarrassed because of my coughing problem 0   People ask, ''What's wrong?'' because I cough a lot 1   I run out of air when I cough 2   My coughing problem affects my voice 2   My coughing problem limits my physical activity 0   My coughing problem upsets me 2   People ask me if I am sick because I cough a lot 1   CSI Score 8     Patient Supplied Answers To EAT Questionnaire  Eating Assessment Tool (EAT-10) 1/6/2020   My swallowing problem has caused me to lose weight 0   My swallowing problem interferes with my ability to go out for meals 0   Swallowing liquids takes extra effort 1   Swallowing solids takes extra effort 1   Swallowing pills takes extra effort 1   Swallowing is painful 4   EAT-10 7     PERCEPTUAL EVALUATION (CPT 39647)  POSTURE / TENSION:     no overt tension visible    BREATHING:     shallow    phonation is not coordinated with respiration    inspiratory stridor with emulated breathing symptoms    LARYNGEAL " PALPATION:     tenderness of the thyrohyoid area    reduced thyrohyoid space    VOICE:    Roughness: Mild Intermittent    Breathiness: Minimal    Strain: Mild Intermittent    Aesthenia: Mild to moderate Consistent     Subtle intermittent tremulous voice quality     Loudness    Conversational speech:  Mildly reduced    Pitch:    Conversational speech:  WNL    Pitch glide:     Good access to loft register with no notable breaks    Resonance:    Conversational speech:  backward focus of resonance with intermittent laryngeal pharyngeal resonance    CAPE-V Overall Severity:  23/100    COUGH/THROAT CLEARING:    Not observed    THERAPY PROBES: Improvement was elicited with use of forward resonant stimuli, coordination of respiration and phonation and use of rescue breathing strategies    LARYNGEAL EXAMINATION  Procedure: Flexible endoscopy with chip-tip technology with stroboscopy, right nostril; topical anesthesia with 3% Lidocaine and 0.25% phenylephrine was applied.   Performed by: Dr. Cortez   The laryngeal and pharyngeal structures were evaluated for gross appearance, mobility, function, and focal lesions / abnormalities of the associated mucosa.  Stroboscopy was warranted to evaluate closure, symmetry, and vibratory characteristics of the vocal folds.  All findings were within normal limits with the exception of the following salient features:     Moderate diffusely dry appearing mucosa nasally as well as laryngeal pharyngeally    Mildly concave vibratory margins    Emulated breathing symptoms resulted in true paradoxical vocal fold motion    Improved ABduction with rescue breathing strategies    Stroboscopy demonstrated essentially healthy vibratory function, open phase predominate closure with persistent anterior glottal gap     Improved glottic configuration and clarity of voicing with increased respiratory drive and glottic coup      Dry appearing mucosa      Supraglottic hyperfunction during running  speech    The laryngeal exam was reviewed with Mr. Schultz, and I provided pertinent explanations, as well as written and oral information.    ASSESSMENT / PLAN  IMPRESSIONS: Juan Alberto Schultz presents today with a greater than 20 year history of episodic throat tightness with corresponding difficulty breathing and sensation of increased mucus which he describes as esophageal spasms.  Today's evaluation demonstrates J 38.3 (vocal cord dysfunction) in the context of J 38.7 (irritable larynx syndrome), and mild R 49.0 (dysphonia) associated with poor phonatory respiratory coordination.  Laryngeal evaluation demonstrated true paradoxical vocal fold motion with emulated breathing symptoms, dry appearing mucosa which may contribute to increased thickness and viscosity of secretions, and open phase predominant closure and nonoptimal phonatory respiratory coordination related to the patient's dysphonia.  With clinician support patient was able to demonstrate improved abduction of the vocal folds with rescue breathing strategies, and improved clarity of voicing with elevated respiratory drive and glottic coup.  Although the patient's episodic symptoms are no doubt multifactorial a significant laryngeal pharyngeal component is present.    RECOMMENDATIONS:     A course of speech therapy is recommended to promote reduced discomfort, effort and fatigue and help reduce episodic spasm events.     Patient was encouraged to talk to his PCP regarding the use of the diuretic.    He was encouraged to increase his systemic and topical hydration to help address thickness of secretions     He demonstrates a Good prognosis for improvement given adherence to therapeutic recommendations.     Positive indicators: positive response to therapy probes diagnosis is known to respond to treatment    Negative indicators: none    DURATION / FREQUENCY: 4 bi-weekly and 2 monthly one-hour sessions    GOALS:  Patient goal:   1. To understand the problem and fix  it as much as possible    Short-term goal(s): Within the first 4 sessions, Mr. Schultz:  1. will demonstrate assigned laryngeal massage techniques with 80% accuracy or better with no clinician support  2. will be able to independently list key factors in maintenance of good vocal hygiene with 80% accuracy, and report on their use outside the therapy room.  3. will utilize silent inhalation with good low-respiratory engagement 75% of the time during therapy tasks with minimal clinician support  4. will accurately identify target vs. habitual voice quality during therapy tasks in 4 out of 5 trials with no clinician support  5. will demonstrate the ability to alternate between target and habitual voice quality given clinician cue 75% of the time during therapy tasks  6. will demonstrate rescue breathing strategies with 100% accuracy and no clinician support    Long-term goal(s): In 6 months, Mr. Schultz will:  1. report three weeks of typical activities in which he does not experience a spasm episode that he is not able to arrest using therapeutic techniques within 2 minutes    Certification period: January 6, 2020 - 4/5/2020    This treatment plan was developed with the patient who agreed with the recommendations.    TOTAL SERVICE TIME: 60 minutes  EVALUATION OF VOICE AND RESONANCE (91641)  NO CHARGE FACILITY FEE (34367)    Marvin Person M.M., M.A., CCC-SLP  Speech-Language Pathologist  Certificate of Vocology  [phone]    *this report was created in part through the use of computerized dictation software, and though reviewed following completion, some typographic errors may persist.  If there is confusion regarding any of this notes contents, please contact me for clarification.*                                                                                       Outpatient Speech Language Therapy Evaluation  PLAN OF TREATMENT FOR OUTPATIENT REHABILITATION  (COMPLETE FOR INITIAL CLAIMS ONLY)  Patient's Last  Name, First Name, M.I.  YOB: 1945  Juan Alberto Schultz                        Provider's Name  Michael Person, SLP Medical Record No.  [id number]                               Onset Date:  1/06/20   Start of Care Date: 1/06/20     Type: Speech Language Therapy Medical Diagnosis: Irritable larynx [J38.7]                        Therapy Diagnosis:  Irritable larynx [J38.7]   Visits from SOC:  1   _________________________________________________________________________________  Plan of Treatment:   Speech therapy    RECOMMENDATIONS:     A course of speech therapy is recommended to promote reduced discomfort, effort and fatigue and help reduce episodic spasm events.     Patient was encouraged to talk to his PCP regarding the use of the diuretic.    He was encouraged to increase his systemic and topical hydration to help address thickness of secretions     He demonstrates a Good prognosis for improvement given adherence to therapeutic recommendations.     Positive indicators: positive response to therapy probes diagnosis is known to respond to treatment    Negative indicators: none    DURATION / FREQUENCY: 4 bi-weekly and 2 monthly one-hour sessions    GOALS:  Patient goal:   2. To understand the problem and fix it as much as possible    Short-term goal(s): Within the first 4 sessions, Mr. Schultz:  7. will demonstrate assigned laryngeal massage techniques with 80% accuracy or better with no clinician support  8. will be able to independently list key factors in maintenance of good vocal hygiene with 80% accuracy, and report on their use outside the therapy room.  9. will utilize silent inhalation with good low-respiratory engagement 75% of the time during therapy tasks with minimal clinician support  10. will accurately identify target vs. habitual voice quality during therapy tasks in 4 out of 5 trials with no clinician support  11. will demonstrate the ability to alternate between target and habitual voice  quality given clinician cue 75% of the time during therapy tasks  12. will demonstrate rescue breathing strategies with 100% accuracy and no clinician support    Long-term goal(s): In 6 months, Mr. Schultz will:  2. report three weeks of typical activities in which he does not experience a spasm episode that he is not able to arrest using therapeutic techniques within 2 minutes  _________________________________________________________________________________    I CERTIFY THE NEED FOR THESE SERVICES FURNISHED UNDER        THIS PLAN OF TREATMENT AND WHILE UNDER MY CARE     (Physician attestation of this document indicates review and certification of the therapy plan).     Certification date from: 1/06/20  Certification date to: 4/5/20    Referring Provider: Shanelle Cortez MD      Again, thank you for allowing me to participate in the care of your patient.      Sincerely,    Michael Person, SLP

## 2020-01-06 NOTE — PROGRESS NOTES
[facility] VOICE CLINIC  Evaluation report    Clinician: Marvin Person M.M., M.A., CCC/SLP  Seen in conjunction with: Dr. Cortez  Referring physician:  Dr. Duffy  Patient: Juan Alberto Schultz  Date of Visit: 1/6/2020    HISTORY  Chief complaint: Juan Alberto Schultz is a 74 year old gentleman presenting today for evaluation of throat spasm.    Salient history: He has a 15 + year history of episodic tightness in his chest and throat which he self diagnosed as esophageal spasms.  These can have many triggers, but can be caused by eating stringy foods or drinking large amounts of liquid.  Symptoms begin with a sensation of tightness and evolve into increased mucus which he has to expectorate and then subsequent breathing difficulties. He underwent esophageal manometry which demonstrated ineffective motility with siimultaneous contraction, but no spasm.  Distal esophageal biopsies demonstrated some reflux changes, and PPI was recommended though suspicion was low for this being primarily causal.  Empiric dilation was performed to 54 Jordanian.  Despite these measures he experienced no resolution of symptoms.  It was Dr. Duffy's feeling that his his symptoms were more laryngeal pharyngeal in nature, and referral to our clinic for further evaluation was recommended.  He presents for this today.    OTHER PERTINENT HISTORY    Otherwise unknown.  Please also refer to Dr. Cortez's dictation.     In response to ankle swelling he decided to take his wife's diuretic.    Past Medical History:   Diagnosis Date     Allergic state      Migraines      Umbilical hernia      Past Surgical History:   Procedure Laterality Date     LAPAROSCOPIC CHOLECYSTECTOMY N/A 10/4/2019    Procedure: CHOLECYSTECTOMY, LAPAROSCOPIC;  Surgeon: Juan Alberto Ramey MD;  Location:  OR     ORTHOPEDIC SURGERY       OBJECTIVE  PATIENT REPORTED MEASURES  Patient Supplied Answers To VHI Questionnaire  Voice Handicap Index (VHI-10) 1/6/2020   My voice makes it difficult for people to  "hear me 2   People have difficulty understanding me in a noisy room 2   My voice difficulties restrict my personal and social life.  1   I feel left out of conversations because of my voice 2   My voice problem causes me to lose income 0   I feel as though I have to strain to produce voice 2   The clarity of my voice is unpredictable 2   My voice problem upsets me 2   My voice makes me feel handicapped 1   People ask, \"What's wrong with your voice?\" 2   VHI-10 16     Patient Supplied Answers To CSI Questionnaire  Cough Severity Index (CSI) 1/6/2020   My cough is worse when I lie down 0   My coughing problem causes me to restrict my personal and social life 0   I tend to avoid places because of my cough problem 0   I feel embarrassed because of my coughing problem 0   People ask, ''What's wrong?'' because I cough a lot 1   I run out of air when I cough 2   My coughing problem affects my voice 2   My coughing problem limits my physical activity 0   My coughing problem upsets me 2   People ask me if I am sick because I cough a lot 1   CSI Score 8     Patient Supplied Answers To EAT Questionnaire  Eating Assessment Tool (EAT-10) 1/6/2020   My swallowing problem has caused me to lose weight 0   My swallowing problem interferes with my ability to go out for meals 0   Swallowing liquids takes extra effort 1   Swallowing solids takes extra effort 1   Swallowing pills takes extra effort 1   Swallowing is painful 4   EAT-10 7     PERCEPTUAL EVALUATION (CPT 73247)  POSTURE / TENSION:     no overt tension visible    BREATHING:     shallow    phonation is not coordinated with respiration    inspiratory stridor with emulated breathing symptoms    LARYNGEAL PALPATION:     tenderness of the thyrohyoid area    reduced thyrohyoid space    VOICE:    Roughness: Mild Intermittent    Breathiness: Minimal    Strain: Mild Intermittent    Aesthenia: Mild to moderate Consistent     Subtle intermittent tremulous voice quality "     Loudness    Conversational speech:  Mildly reduced    Pitch:    Conversational speech:  WNL    Pitch glide:     Good access to loft register with no notable breaks    Resonance:    Conversational speech:  backward focus of resonance with intermittent laryngeal pharyngeal resonance    CAPE-V Overall Severity:  23/100    COUGH/THROAT CLEARING:    Not observed    THERAPY PROBES: Improvement was elicited with use of forward resonant stimuli, coordination of respiration and phonation and use of rescue breathing strategies    LARYNGEAL EXAMINATION  Procedure: Flexible endoscopy with chip-tip technology with stroboscopy, right nostril; topical anesthesia with 3% Lidocaine and 0.25% phenylephrine was applied.   Performed by: Dr. Cortez   The laryngeal and pharyngeal structures were evaluated for gross appearance, mobility, function, and focal lesions / abnormalities of the associated mucosa.  Stroboscopy was warranted to evaluate closure, symmetry, and vibratory characteristics of the vocal folds.  All findings were within normal limits with the exception of the following salient features:     Moderate diffusely dry appearing mucosa nasally as well as laryngeal pharyngeally    Mildly concave vibratory margins    Emulated breathing symptoms resulted in true paradoxical vocal fold motion    Improved ABduction with rescue breathing strategies    Stroboscopy demonstrated essentially healthy vibratory function, open phase predominate closure with persistent anterior glottal gap     Improved glottic configuration and clarity of voicing with increased respiratory drive and glottic coup      Dry appearing mucosa      Supraglottic hyperfunction during running speech    The laryngeal exam was reviewed with Mr. Schultz, and I provided pertinent explanations, as well as written and oral information.    ASSESSMENT / PLAN  IMPRESSIONS: Juan Alberto Schultz presents today with a greater than 20 year history of episodic throat tightness with  corresponding difficulty breathing and sensation of increased mucus which he describes as esophageal spasms.  Today's evaluation demonstrates J 38.3 (vocal cord dysfunction) in the context of J 38.7 (irritable larynx syndrome), and mild R 49.0 (dysphonia) associated with poor phonatory respiratory coordination.  Laryngeal evaluation demonstrated true paradoxical vocal fold motion with emulated breathing symptoms, dry appearing mucosa which may contribute to increased thickness and viscosity of secretions, and open phase predominant closure and nonoptimal phonatory respiratory coordination related to the patient's dysphonia.  With clinician support patient was able to demonstrate improved abduction of the vocal folds with rescue breathing strategies, and improved clarity of voicing with elevated respiratory drive and glottic coup.  Although the patient's episodic symptoms are no doubt multifactorial a significant laryngeal pharyngeal component is present.    RECOMMENDATIONS:     A course of speech therapy is recommended to promote reduced discomfort, effort and fatigue and help reduce episodic spasm events.     Patient was encouraged to talk to his PCP regarding the use of the diuretic.    He was encouraged to increase his systemic and topical hydration to help address thickness of secretions     He demonstrates a Good prognosis for improvement given adherence to therapeutic recommendations.     Positive indicators: positive response to therapy probes diagnosis is known to respond to treatment    Negative indicators: none    DURATION / FREQUENCY: 4 bi-weekly and 2 monthly one-hour sessions    GOALS:  Patient goal:   1. To understand the problem and fix it as much as possible    Short-term goal(s): Within the first 4 sessions, Mr. Schultz:  1. will demonstrate assigned laryngeal massage techniques with 80% accuracy or better with no clinician support  2. will be able to independently list key factors in maintenance of  good vocal hygiene with 80% accuracy, and report on their use outside the therapy room.  3. will utilize silent inhalation with good low-respiratory engagement 75% of the time during therapy tasks with minimal clinician support  4. will accurately identify target vs. habitual voice quality during therapy tasks in 4 out of 5 trials with no clinician support  5. will demonstrate the ability to alternate between target and habitual voice quality given clinician cue 75% of the time during therapy tasks  6. will demonstrate rescue breathing strategies with 100% accuracy and no clinician support    Long-term goal(s): In 6 months, Mr. Schultz will:  1. report three weeks of typical activities in which he does not experience a spasm episode that he is not able to arrest using therapeutic techniques within 2 minutes    Certification period: January 6, 2020 - 4/5/2020    This treatment plan was developed with the patient who agreed with the recommendations.    TOTAL SERVICE TIME: 60 minutes  EVALUATION OF VOICE AND RESONANCE (74390)  NO CHARGE FACILITY FEE (60877)    Marvin Person M.M., M.A., CCC-SLP  Speech-Language Pathologist  Certificate of Vocology  [phone]    *this report was created in part through the use of computerized dictation software, and though reviewed following completion, some typographic errors may persist.  If there is confusion regarding any of this notes contents, please contact me for clarification.*

## 2020-01-06 NOTE — NURSING NOTE
"Chief Complaint   Patient presents with     Consult     Throat pain, tightening, globus sensation     Height 1.778 m (5' 10\"), weight 97.1 kg (214 lb).    Teena Canales, EMT  "

## 2020-01-06 NOTE — PATIENT INSTRUCTIONS
Thank you for choosing  [facility].  Please follow up with Dr. Cortez as needed or sooner if symptoms worsens or does not improve   Dr. Cortez recommends speech therapy at this time.  Please follow up with your primary care provider about hydrochlorothiazide.  [phone] appointment scheduling option 1 and nurse advice option 3.

## 2020-01-06 NOTE — LETTER
1/6/2020      RE: Juan Alberto Schultz  [address]  [facility] [address]       Dear Mr. Duffy:    I had the pleasure of meeting Juan Alberto Schultz in consultation at the [facility] of the [facility] at your request, for evaluation of multiple throat concerns. The note from our visit follows. Speech recognition software may have been used in the documentation below; input is reviewed before signature to the best of my ability. I appreciate the opportunity to participate in the care of this pleasant patient.    Please feel free to contact me with any questions.    Sincerely yours,    Shanelle Cortez M.D., M.P.H.  , Laryngology  Director, [facility]  Otolaryngology- Head & Neck Surgery  [phone]          =====  History of Present Illness:   Juan Alberto Schultz is a pleasant 74-year-old male with a history of TIA and Asperger's who presents with a decades long history of throat concerns. Symptoms include increased effort to talk/sing, throat tightness, pain/ache in throat, lump in throat, mucus in throat, frequent throat-clearing, frequent cough, poor voice quality, raspy voice and scratchy voice.      Voice  He reports a problem with his voice for many years, in conjunction with possible self-described esophageal spasms, also associated with a sensation of excessive mucus production. The problem began gradually and is unpredictable. It is worsening over time. His typical vocal demand is routine. He considers this to be a moderate problem.    His esophageal spasm diagnosis is self-diagnosed, possibly also raised by a provider at an Emergency Room visit.  This can last up to six hours at a time.  The symptoms can be triggered by swallowing stringy foods such as pastas or meat, or a large amount of liquid or cold drinks.  This occurs 15-20 times a year.  At these times, he also has some associated difficulty breathing and finds that he  is suddenly tearful following these episodes.  He is raised the question of whether he might have some anxiety or PTSD related to these episodes.      Swallowing  When he is having the symptoms, he feels like there is a pressure sensation that leads to difficulty with swallowing.  No prior swallow study for review.    Sometimes, the throat tightness eases up over time if he takes a break.  At other times, he develops mucus in his throat as well as a sensation of fluid in his left ear. Sometimes burping helps.      Cough/Throat-clearing  As above, when he has these episodes it can stir up significant coughing.      Breathing  When he has the spasm feeling sometimes he feels like his breathing gets tight too, and he cannot breathe. He has difficulty demonstrating, but both he and his wife identified inspiratory stridor when I demonstrated.    He develops syrupy mucus in his throat, which is very thick and sticky, when it happens. It feels like silly putty per his wife. He feels like he is aspirating it.      Throat discomfort  As above.      Reflux-type symptoms  He experiences heartburn/indigestion: never. He is not taking reflux medications. He has been seen by [facility] and work-up there included upper endoscopy which was done in November 2019 and showed a normal-appearing esophagus.  Mid esophageal biopsies were normal, distal biopsy showed some changes consistent with reflux.  The patient had an empiric dilation at that time.  He reportedly developed severe pain following the upper endoscopy which triggered spasms in his throat.  Esophageal manometry demonstrated ineffective esophageal mobility with 90% failed swallows.  There were some simultaneous contractions.  There was some concern that anxiety might be contributing to his symptoms.  However, he was asked to be evaluated with us to determine whether there might be any laryngeal pharyngeal abnormalities that could account for some of his  symptoms.      Prior Epic and outside records were reviewed for this visit. He also noted that he is taking his wife's hydrochlorothiazide for lower extremity swelling.      MEDICATIONS:     Current Outpatient Medications   Medication Sig Dispense Refill     acetaminophen (TYLENOL) 325 MG tablet Take 2 tablets (650 mg) by mouth every 4 hours as needed for other (mild pain) 100 tablet 0     aspirin (ASA) 81 MG chewable tablet Take 81 mg by mouth daily       atorvastatin (LIPITOR) 40 MG tablet Take 40 mg by mouth daily       lisinopril (PRINIVIL/ZESTRIL) 40 MG tablet Take 20 mg by mouth daily        senna-docusate (SENOKOT-S/PERICOLACE) 8.6-50 MG tablet Take 1-2 tablets by mouth 2 times daily Take while on oral narcotics to prevent or treat constipation. 30 tablet 0       ALLERGIES:    Allergies   Allergen Reactions     Antihistamines, Chlorpheniramine-Type [Alkylamines]      Valium [Diazepam]        PAST MEDICAL HISTORY:   Past Medical History:   Diagnosis Date     Allergic state      Migraines      Umbilical hernia         PAST SURGICAL HISTORY:   Past Surgical History:   Procedure Laterality Date     LAPAROSCOPIC CHOLECYSTECTOMY N/A 10/4/2019    Procedure: CHOLECYSTECTOMY, LAPAROSCOPIC;  Surgeon: Juan Alberto Ramey MD;  Location:  OR     ORTHOPEDIC SURGERY         HABITS/SOCIAL HISTORY:    Social History     Tobacco Use     Smoking status: Never Smoker     Smokeless tobacco: Never Used   Substance Use Topics     Alcohol use: No         FAMILY HISTORY:  No family history on file. Noncontributory.    REVIEW OF SYSTEMS:  The patient completed a comprehensive 11 point review of systems (below), which was reviewed. Positives are as noted below; pertinent findings are as noted in the HPI.     Patient Supplied Answers to Review of Systems  [facility] ROS 1/6/2020   Constitutional Weight gain   Ears, Nose, Throat Hearing loss, Ringing/noise in ears, Trouble swallowing, Hoarseness   Cardiopulmonary Cough, Breathing problems    Musculoskeletal Swollen legs/feet       PHYSICAL EXAMINATION:  General: The patient was alert and conversant, and in no acute distress. Patient accompanied by his spouse.  Eyes: PERRL, conjunctiva and lids normal, sclera nonicteric.  Nose: Anterior rhinoscopy: no gross abnormalities. no  bleeding; no  mucopurulence; septum grossly normal, mild mucoid drainage and/or crusting.  Oral cavity/oropharynx: No masses or lesions. Dentition in good condition. Floor of mouth and oral tongue soft to palpation. Tongue mobility and palate elevation intact and symmetric.  Ears: Normal auricles, external auditory canals bilaterally. Visualized portions of tympanic membranes normal bilaterally.   Neck: No palpable cervical lymphadenopathy. There was severe  tenderness to palpation of the thyrohyoid space, which was narrow. No obvious thyroid abnormality. Landmarks palpable.  Resp: Breathing comfortably, no stridor or stertor.  Neuro: Symmetric facial function. Other cranial nerves as documented above.  Psych: Normal affect, pleasant and cooperative.  Voice/speech: No significant dysphonia of speaking voice. Slow speaking pace, occasionally mildly dysarthric.  Extremities: No cyanosis, clubbing, or edema of the upper extremities.    Intake scores  Total Score for Last Patient-Answered VHI Questionnaire  VHI Total Score 1/6/2020   VHI Total Score 16     Total Score for Last Patient-Answered EAT Questionnaire  EAT Total Score 1/6/2020   EAT Total Score Incomplete       Total Score for Last Patient-Answered CSI Questionnaire  CSI Total Score 1/6/2020   CSI Total Score 8       PROCEDURE:   Flexible fiberoptic laryngoscopy and laryngovideostroboscopy  Indications: This procedure was warranted to evaluate the patient's laryngeal anatomy and function. Risks, benefits, and alternatives were discussed.  Description: After written informed consent was obtained, a time-out was performed to confirm patient identity, procedure, and procedure  site. Topical 3% lidocaine with 0.25% phenylephrine was applied to the nasal cavities. I performed the endoscopy and no complications were apparent. Continuous and stroboscopic light were utilized to assess routine phonation and variable frequency phonation.  Performed by: Shanelle Cortez MD MPH  SLP: Marvin Person MM, MA, CCC-SLP   Findings: Normal nasopharynx. Normal base of tongue, valleculae, and epiglottis. Vocal fold mobility: right: normal; left: normal. Medial edges of the vocal folds: smooth and straight. No focal mucosal lesions were observed on the true vocal folds. Glissade produced appropriate elongation. There was moderate supraglottic recruitment with connected speech. Mucosa of false vocal folds, aryepiglottic folds, piriform sinuses, and posterior glottis unremarkable. Airway was patent. Response to the therapy probes was good. No focal lesions on NBI.  When asked to mimic his noisy breathing and difficulty breathing, he demonstrated paradoxical vocal fold motion.    The addition of stroboscopy allowed evaluation of the mucosal wave.   Amplitude: right: normal; left: normal. Symmetry: intermittent symmetry. Closure pattern: complete. Closure plane: at glottic level. Phase distribution: normal.      IMPRESSION AND PLAN:   Juan Alberto Schultz presents with multiple throat concerns, several of which may be at least partially attributable to irritable larynx symptoms and paradoxical vocal fold motion/vocal fold dysfunction.      We discussed that he also has esophageal dysmotility which may be contributing somewhat, but it is worth addressing the laryngeal and perilaryngeal findings as this is likely to help with his quality of life. I recommended speech therapy as initial primary management, with goals including reducing laryngeal irritability, improving laryngeal efficiency, decreasing vocal fold trauma and improving respiratory/phonatory coordination.  He felt that our discussion resonated with him in  terms of his symptoms and his reactions to them.      We did discuss that if his hypophonia and anxiety seem to persist or progress despite speech therapy, we would strongly encourage him to consult with his primary care provider for further evaluation.      He also reports a history of extremely thick secretions which were not present at the time of this evaluation. His mucous membranes did not appear unusually dry, but a workup for Sjogren's could be considered if the secretions persist. A swallow study could also be considered if his swallow challenges persist.    I also recommended that he discuss the hydrochlorothiazide with his primary care physician since he is taking his wife's without an prescription of his own.      Finally, he has previously been encouraged by others to pursue a hearing evaluation and I recommended that he see this through, as I think he will benefit more from speech therapy if his hearing is optimized.  We can arrange this for him at this facility or he is welcome to pursue this elsewhere, whichever is more convenient for him.      He will return to see me if he has persistent throat concerns despite speech therapy, with additional possible studies as noted above. I appreciate the opportunity to participate in the care of this pleasant patient.       Shanelle Cortez MD

## 2020-01-06 NOTE — PROGRESS NOTES
Dear  Tati:    I had the pleasure of meeting Juan Alberto Schultz in consultation at the [facility] of the [facility] at your request, for evaluation of multiple throat concerns. The note from our visit follows. Speech recognition software may have been used in the documentation below; input is reviewed before signature to the best of my ability. I appreciate the opportunity to participate in the care of this pleasant patient.    Please feel free to contact me with any questions.    Sincerely yours,    Shanelle Cortez M.D., M.P.H.  , Laryngology  Director, [facility]  Otolaryngology- Head & Neck Surgery  [phone]          =====  History of Present Illness:   Juan Alberto Schultz is a pleasant 74-year-old male with a history of TIA and Asperger's who presents with a decades long history of throat concerns. Symptoms include increased effort to talk/sing, throat tightness, pain/ache in throat, lump in throat, mucus in throat, frequent throat-clearing, frequent cough, poor voice quality, raspy voice and scratchy voice.      Voice  He reports a problem with his voice for many years, in conjunction with possible self-described esophageal spasms, also associated with a sensation of excessive mucus production. The problem began gradually and is unpredictable. It is worsening over time. His typical vocal demand is routine. He considers this to be a moderate problem.    His esophageal spasm diagnosis is self-diagnosed, possibly also raised by a provider at an Emergency Room visit.  This can last up to six hours at a time.  The symptoms can be triggered by swallowing stringy foods such as pastas or meat, or a large amount of liquid or cold drinks.  This occurs 15-20 times a year.  At these times, he also has some associated difficulty breathing and finds that he is suddenly tearful following these episodes.  He is raised the question of  whether he might have some anxiety or PTSD related to these episodes.      Swallowing  When he is having the symptoms, he feels like there is a pressure sensation that leads to difficulty with swallowing.  No prior swallow study for review.    Sometimes, the throat tightness eases up over time if he takes a break.  At other times, he develops mucus in his throat as well as a sensation of fluid in his left ear. Sometimes burping helps.      Cough/Throat-clearing  As above, when he has these episodes it can stir up significant coughing.      Breathing  When he has the spasm feeling sometimes he feels like his breathing gets tight too, and he cannot breathe. He has difficulty demonstrating, but both he and his wife identified inspiratory stridor when I demonstrated.    He develops syrupy mucus in his throat, which is very thick and sticky, when it happens. It feels like silly putty per his wife. He feels like he is aspirating it.      Throat discomfort  As above.      Reflux-type symptoms  He experiences heartburn/indigestion: never. He is not taking reflux medications. He has been seen by [facility] and work-up there included upper endoscopy which was done in November 2019 and showed a normal-appearing esophagus.  Mid esophageal biopsies were normal, distal biopsy showed some changes consistent with reflux.  The patient had an empiric dilation at that time.  He reportedly developed severe pain following the upper endoscopy which triggered spasms in his throat.  Esophageal manometry demonstrated ineffective esophageal mobility with 90% failed swallows.  There were some simultaneous contractions.  There was some concern that anxiety might be contributing to his symptoms.  However, he was asked to be evaluated with us to determine whether there might be any laryngeal pharyngeal abnormalities that could account for some of his symptoms.      Prior Epic and outside records were reviewed for this visit. He  also noted that he is taking his wife's hydrochlorothiazide for lower extremity swelling.      MEDICATIONS:     Current Outpatient Medications   Medication Sig Dispense Refill     acetaminophen (TYLENOL) 325 MG tablet Take 2 tablets (650 mg) by mouth every 4 hours as needed for other (mild pain) 100 tablet 0     aspirin (ASA) 81 MG chewable tablet Take 81 mg by mouth daily       atorvastatin (LIPITOR) 40 MG tablet Take 40 mg by mouth daily       lisinopril (PRINIVIL/ZESTRIL) 40 MG tablet Take 20 mg by mouth daily        senna-docusate (SENOKOT-S/PERICOLACE) 8.6-50 MG tablet Take 1-2 tablets by mouth 2 times daily Take while on oral narcotics to prevent or treat constipation. 30 tablet 0       ALLERGIES:    Allergies   Allergen Reactions     Antihistamines, Chlorpheniramine-Type [Alkylamines]      Valium [Diazepam]        PAST MEDICAL HISTORY:   Past Medical History:   Diagnosis Date     Allergic state      Migraines      Umbilical hernia         PAST SURGICAL HISTORY:   Past Surgical History:   Procedure Laterality Date     LAPAROSCOPIC CHOLECYSTECTOMY N/A 10/4/2019    Procedure: CHOLECYSTECTOMY, LAPAROSCOPIC;  Surgeon: Juan Alberto Ramey MD;  Location: [facility] OR     ORTHOPEDIC SURGERY         HABITS/SOCIAL HISTORY:    Social History     Tobacco Use     Smoking status: Never Smoker     Smokeless tobacco: Never Used   Substance Use Topics     Alcohol use: No         FAMILY HISTORY:  No family history on file. Noncontributory.    REVIEW OF SYSTEMS:  The patient completed a comprehensive 11 point review of systems (below), which was reviewed. Positives are as noted below; pertinent findings are as noted in the HPI.     Patient Supplied Answers to Review of Systems  [facility] ROS 1/6/2020   Constitutional Weight gain   Ears, Nose, Throat Hearing loss, Ringing/noise in ears, Trouble swallowing, Hoarseness   Cardiopulmonary Cough, Breathing problems   Musculoskeletal Swollen legs/feet       PHYSICAL EXAMINATION:  General: The  patient was alert and conversant, and in no acute distress. Patient accompanied by his spouse.  Eyes: PERRL, conjunctiva and lids normal, sclera nonicteric.  Nose: Anterior rhinoscopy: no gross abnormalities. no  bleeding; no  mucopurulence; septum grossly normal, mild mucoid drainage and/or crusting.  Oral cavity/oropharynx: No masses or lesions. Dentition in good condition. Floor of mouth and oral tongue soft to palpation. Tongue mobility and palate elevation intact and symmetric.  Ears: Normal auricles, external auditory canals bilaterally. Visualized portions of tympanic membranes normal bilaterally.   Neck: No palpable cervical lymphadenopathy. There was severe  tenderness to palpation of the thyrohyoid space, which was narrow. No obvious thyroid abnormality. Landmarks palpable.  Resp: Breathing comfortably, no stridor or stertor.  Neuro: Symmetric facial function. Other cranial nerves as documented above.  Psych: Normal affect, pleasant and cooperative.  Voice/speech: No significant dysphonia of speaking voice. Slow speaking pace, occasionally mildly dysarthric.  Extremities: No cyanosis, clubbing, or edema of the upper extremities.    Intake scores  Total Score for Last Patient-Answered VHI Questionnaire  VHI Total Score 1/6/2020   VHI Total Score 16     Total Score for Last Patient-Answered EAT Questionnaire  EAT Total Score 1/6/2020   EAT Total Score Incomplete       Total Score for Last Patient-Answered CSI Questionnaire  CSI Total Score 1/6/2020   CSI Total Score 8       PROCEDURE:   Flexible fiberoptic laryngoscopy and laryngovideostroboscopy  Indications: This procedure was warranted to evaluate the patient's laryngeal anatomy and function. Risks, benefits, and alternatives were discussed.  Description: After written informed consent was obtained, a time-out was performed to confirm patient identity, procedure, and procedure site. Topical 3% lidocaine with 0.25% phenylephrine was applied to the nasal  cavities. I performed the endoscopy and no complications were apparent. Continuous and stroboscopic light were utilized to assess routine phonation and variable frequency phonation.  Performed by: Shanelle Cortez MD MPH  SLP: Marvin Person MM, MA, CCC-SLP   Findings: Normal nasopharynx. Normal base of tongue, valleculae, and epiglottis. Vocal fold mobility: right: normal; left: normal. Medial edges of the vocal folds: smooth and straight. No focal mucosal lesions were observed on the true vocal folds. Glissade produced appropriate elongation. There was moderate supraglottic recruitment with connected speech. Mucosa of false vocal folds, aryepiglottic folds, piriform sinuses, and posterior glottis unremarkable. Airway was patent. Response to the therapy probes was good. No focal lesions on NBI.  When asked to mimic his noisy breathing and difficulty breathing, he demonstrated paradoxical vocal fold motion.    The addition of stroboscopy allowed evaluation of the mucosal wave.   Amplitude: right: normal; left: normal. Symmetry: intermittent symmetry. Closure pattern: complete. Closure plane: at glottic level. Phase distribution: normal.      IMPRESSION AND PLAN:   Juan Alberto Schultz presents with multiple throat concerns, several of which may be at least partially attributable to irritable larynx symptoms and paradoxical vocal fold motion/vocal fold dysfunction.      We discussed that he also has esophageal dysmotility which may be contributing somewhat, but it is worth addressing the laryngeal and perilaryngeal findings as this is likely to help with his quality of life. I recommended speech therapy as initial primary management, with goals including reducing laryngeal irritability, improving laryngeal efficiency, decreasing vocal fold trauma and improving respiratory/phonatory coordination.  He felt that our discussion resonated with him in terms of his symptoms and his reactions to them.      We did discuss that if  his hypophonia and anxiety seem to persist or progress despite speech therapy, we would strongly encourage him to consult with his primary care provider for further evaluation.      He also reports a history of extremely thick secretions which were not present at the time of this evaluation. His mucous membranes did not appear unusually dry, but a workup for Sjogren's could be considered if the secretions persist. A swallow study could also be considered if his swallow challenges persist.    I also recommended that he discuss the hydrochlorothiazide with his primary care physician since he is taking his wife's without an prescription of his own.      Finally, he has previously been encouraged by others to pursue a hearing evaluation and I recommended that he see this through, as I think he will benefit more from speech therapy if his hearing is optimized.  We can arrange this for him at this facility or he is welcome to pursue this elsewhere, whichever is more convenient for him.      He will return to see me if he has persistent throat concerns despite speech therapy, with additional possible studies as noted above. I appreciate the opportunity to participate in the care of this pleasant patient.

## 2020-01-07 NOTE — PROGRESS NOTES
Outpatient Speech Language Therapy Evaluation  PLAN OF TREATMENT FOR OUTPATIENT REHABILITATION  (COMPLETE FOR INITIAL CLAIMS ONLY)  Patient's Last Name, First Name, M.I.  YOB: 1945  Juan Alberto Schultz                        Provider's Name  Michael Naya, SLP Medical Record No.  [id number]                               Onset Date:  1/06/20   Start of Care Date: 1/06/20     Type: Speech Language Therapy Medical Diagnosis: Irritable larynx [J38.7]                        Therapy Diagnosis:  Irritable larynx [J38.7]   Visits from SOC:  1   _________________________________________________________________________________  Plan of Treatment:   Speech therapy    RECOMMENDATIONS:     A course of speech therapy is recommended to promote reduced discomfort, effort and fatigue and help reduce episodic spasm events.     Patient was encouraged to talk to his PCP regarding the use of the diuretic.    He was encouraged to increase his systemic and topical hydration to help address thickness of secretions     He demonstrates a Good prognosis for improvement given adherence to therapeutic recommendations.     Positive indicators: positive response to therapy probes diagnosis is known to respond to treatment    Negative indicators: none    DURATION / FREQUENCY: 4 bi-weekly and 2 monthly one-hour sessions    GOALS:  Patient goal:   1. To understand the problem and fix it as much as possible    Short-term goal(s): Within the first 4 sessions, Mr. Schultz:  1. will demonstrate assigned laryngeal massage techniques with 80% accuracy or better with no clinician support  2. will be able to independently list key factors in maintenance of good vocal hygiene with 80% accuracy, and report on their use outside the therapy room.  3. will utilize silent inhalation with good low-respiratory engagement 75% of the time during therapy tasks with minimal  clinician support  4. will accurately identify target vs. habitual voice quality during therapy tasks in 4 out of 5 trials with no clinician support  5. will demonstrate the ability to alternate between target and habitual voice quality given clinician cue 75% of the time during therapy tasks  6. will demonstrate rescue breathing strategies with 100% accuracy and no clinician support    Long-term goal(s): In 6 months, Mr. cShultz will:  1. report three weeks of typical activities in which he does not experience a spasm episode that he is not able to arrest using therapeutic techniques within 2 minutes  _________________________________________________________________________________    I CERTIFY THE NEED FOR THESE SERVICES FURNISHED UNDER        THIS PLAN OF TREATMENT AND WHILE UNDER MY CARE     (Physician attestation of this document indicates review and certification of the therapy plan).     Certification date from: 1/06/20  Certification date to: 4/5/20    Referring Provider: Shanelle Cortez MD

## 2020-01-08 NOTE — PROGRESS NOTES
Medicare Certification  Physician Attestation   I agree with the information in this note.    Shanelle Cortez MD

## 2020-02-23 NOTE — PROGRESS NOTES
"[facility] VOICE CLINIC  THERAPY NOTE (CPT 87990)    Patient: Juan Alberto Schultz  Date of Service: 2/24/2020  Referring physician: Dr. Cortez  Impressions from most recent evaluation: (01/06/2020)  \"IMPRESSIONS: Juan Alberto Schultz presents today with a greater than 20 year history of episodic throat tightness with corresponding difficulty breathing and sensation of increased mucus which he describes as esophageal spasms.  Today's evaluation demonstrates J 38.3 (vocal cord dysfunction) in the context of J 38.7 (irritable larynx syndrome), and mild R 49.0 (dysphonia) associated with poor phonatory respiratory coordination.  Laryngeal evaluation demonstrated true paradoxical vocal fold motion with emulated breathing symptoms, dry appearing mucosa which may contribute to increased thickness and viscosity of secretions, and open phase predominant closure and nonoptimal phonatory respiratory coordination related to the patient's dysphonia.  With clinician support patient was able to demonstrate improved abduction of the vocal folds with rescue breathing strategies, and improved clarity of voicing with elevated respiratory drive and glottic coup.  Although the patient's episodic symptoms are no doubt multifactorial a significant laryngeal pharyngeal component is present.\"      SUBJECTIVE:  Since the patient's last session, they report the following:     Overall symptoms are a little better    OBJECTIVE:  PATIENT REPORTED MEASURES:  Patient Supplied Answers To SLP QOL Questionnaire  Therapy Quality of Life 2/24/2020   Since my l ast session, I used the speech therapy exercises and strategies as recommended by my speech pathologist. Not applicable   I feel that using my therapy techniques has become a habit Not applicable   I feel confident in my ability to manage my current and future symptoms. Disagree   Since my last session I feel my symptoms have --------. Improved   Overall, since starting therapy I feel my symptoms are --------. Better " "  Overall, how much better? A little better       THERAPEUTIC ACTIVITIES    Counseling and Education:    Over half of today's visit was spent discussing the patient's symptoms and answering his questions. He asked many questions about the nature of his symptoms, and I answered all of these thoroughly.    Exercises and techniques for optimal vocal hygiene including:    Systemic hydration, including strategies for increasing daily water intake    Topical hydration - Gargling (saline and plain water), saline nasal irrigation, humidification, steam, guaifenesin to reduce the thickened secretions / laryngeal irritation.    Exercises to promote optimal respiratory mechanics    I provided explanation of the anatomy and physiology of respiration for speech and singing; he found this to be helpful    I provided explanation of what vocal cord dysfunction is and how it is treated    Rescue breathing strategies using oral configurations to promote improved vocal fold abduction were instructed    Patient reported breathing in through rounded lips and out with a \"sh' was most beneficial    A plan for when and how to implement these strategies was developed, and the patient was encouraged to practice the techniques independent of distress two times daily to habituate their use.    75% accuracy with mild to moderate clinician support        A regimen for home practice was instructed.    I provided handouts of today's therapeutic activities to facilitate practice. Pt requested these be emailed to him. I emailed these handouts to the email on file.     ASSESSMENT/PLAN  PROGRESS TOWARD LONG TERM GOALS:   Minimal at this point, as this is first session, but good learning today    IMPRESSIONS: J38.3 (vocal cord dysfunction) in the context of J38.7 (irritable larynx syndrome), and mild R 49.0 (dysphonia) associated with poor phonatory respiratory coordination.  Mr. Schultz had many questions about anatomy/physiology of the larynx and his " symptoms. A great deal of today's visit was spent educating him on the larynx and answering questions about his symptoms. He stated feeling very relieved to learn about the rescue breathing strategies as he was very scared about his breathing difficulties.       PLAN: I will see Mr. Schultz in 2 weeks, at which point we will continue discussing vocal hygiene practices and begin laryngeal massage.    For practice goals see AVS.     Certification date from: 1/06/20  Certification date to: 4/5/20      TOTAL SERVICE TIME: 60 minutes  TREATMENT (35147): 60 minutes  NO CHARGE FACILITY FEE (82170)      FIONA Hayes (tan), M.A., CFY-SLP  Speech Language Pathology Clinical Fellow  [facility] Trained Vocologist   [facility] Voice [facility]   [phone]  [email or web address]

## 2020-02-24 ENCOUNTER — OFFICE VISIT (OUTPATIENT)
Dept: OTOLARYNGOLOGY | Facility: CLINIC | Age: 75
End: 2020-02-24
Payer: MEDICARE

## 2020-02-24 DIAGNOSIS — J38.7 IRRITABLE LARYNX: ICD-10-CM

## 2020-02-24 DIAGNOSIS — J38.3 VOCAL FOLD DYSFUNCTION: Primary | ICD-10-CM

## 2020-02-24 DIAGNOSIS — R49.0 DYSPHONIA: ICD-10-CM

## 2020-02-24 NOTE — LETTER
"2/24/2020       RE: Juan Alberto Schultz  [address]  [place] [address]     Dear Colleague,    Thank you for referring your patient, Juan Alberto Schultz, to the [facility] VOICE at [facility]. Please see a copy of my visit note below.    [facility] VOICE CLINIC  THERAPY NOTE (CPT 40892)    Patient: Juan Alberto Schultz  Date of Service: 2/24/2020  Referring physician: Dr. Cortez  Impressions from most recent evaluation: (01/06/2020)  \"IMPRESSIONS: Juan Alberto Schultz presents today with a greater than 20 year history of episodic throat tightness with corresponding difficulty breathing and sensation of increased mucus which he describes as esophageal spasms.  Today's evaluation demonstrates J 38.3 (vocal cord dysfunction) in the context of J 38.7 (irritable larynx syndrome), and mild R 49.0 (dysphonia) associated with poor phonatory respiratory coordination.  Laryngeal evaluation demonstrated true paradoxical vocal fold motion with emulated breathing symptoms, dry appearing mucosa which may contribute to increased thickness and viscosity of secretions, and open phase predominant closure and nonoptimal phonatory respiratory coordination related to the patient's dysphonia.  With clinician support patient was able to demonstrate improved abduction of the vocal folds with rescue breathing strategies, and improved clarity of voicing with elevated respiratory drive and glottic coup.  Although the patient's episodic symptoms are no doubt multifactorial a significant laryngeal pharyngeal component is present.\"      SUBJECTIVE:  Since the patient's last session, they report the following:     Overall symptoms are a little better    OBJECTIVE:  PATIENT REPORTED MEASURES:  Patient Supplied Answers To SLP QOL Questionnaire  Therapy Quality of Life 2/24/2020   Since my l ast session, I used the speech therapy exercises and strategies as recommended by my speech pathologist. Not applicable   I feel that using my therapy " "techniques has become a habit Not applicable   I feel confident in my ability to manage my current and future symptoms. Disagree   Since my last session I feel my symptoms have --------. Improved   Overall, since starting therapy I feel my symptoms are --------. Better   Overall, how much better? A little better       THERAPEUTIC ACTIVITIES    Counseling and Education:    Over half of today's visit was spent discussing the patient's symptoms and answering his questions. He asked many questions about the nature of his symptoms, and I answered all of these thoroughly.    Exercises and techniques for optimal vocal hygiene including:    Systemic hydration, including strategies for increasing daily water intake    Topical hydration - Gargling (saline and plain water), saline nasal irrigation, humidification, steam, guaifenesin to reduce the thickened secretions / laryngeal irritation.    Exercises to promote optimal respiratory mechanics    I provided explanation of the anatomy and physiology of respiration for speech and singing; he found this to be helpful    I provided explanation of what vocal cord dysfunction is and how it is treated    Rescue breathing strategies using oral configurations to promote improved vocal fold abduction were instructed    Patient reported breathing in through rounded lips and out with a \"sh' was most beneficial    A plan for when and how to implement these strategies was developed, and the patient was encouraged to practice the techniques independent of distress two times daily to habituate their use.    75% accuracy with mild to moderate clinician support        A regimen for home practice was instructed.    I provided handouts of today's therapeutic activities to facilitate practice. Pt requested these be emailed to him. I emailed these handouts to the email on file.     ASSESSMENT/PLAN  PROGRESS TOWARD LONG TERM GOALS:   Minimal at this point, as this is first session, but good learning " today    IMPRESSIONS: J38.3 (vocal cord dysfunction) in the context of J38.7 (irritable larynx syndrome), and mild R 49.0 (dysphonia) associated with poor phonatory respiratory coordination.  Mr. Schultz had many questions about anatomy/physiology of the larynx and his symptoms. A great deal of today's visit was spent educating him on the larynx and answering questions about his symptoms. He stated feeling very relieved to learn about the rescue breathing strategies as he was very scared about his breathing difficulties.       PLAN: I will see Mr. Schultz in 2 weeks, at which point we will continue discussing vocal hygiene practices and begin laryngeal massage.    For practice goals see AVS.     Certification date from: 1/06/20  Certification date to: 4/5/20      TOTAL SERVICE TIME: 60 minutes  TREATMENT (40420): 60 minutes  NO CHARGE FACILITY FEE (52117)      FIONA Hayes M.A. (music), MELODIE-SLP  Speech Language Pathology Clinical Fellow  [facility] Trained Vocologist   [facility]   [phone]  [email or web address]      Please feel free to email/call questions to me if you have any questions in between sessions:    FIONA Hayes M.A. (music), MELODIE-SLP  Speech Language Pathology Clinical Fellow  [facility] Trained Vocologist   [facility]   [phone] Direct Line  [email or web address]      Again, thank you for allowing me to participate in the care of your patient.      Sincerely,    CHRISTIAN Hayes

## 2020-02-24 NOTE — PROGRESS NOTES
Please feel free to email/call questions to me if you have any questions in between sessions:    FIONA Hayes M.A. (music), CFY-SLP  Speech Language Pathology Clinical Fellow  [facility] Trained Vocologist   [facility] Voice [facility]   [phone] Direct Line  [email or web address]

## 2020-05-11 ENCOUNTER — TELEPHONE (OUTPATIENT)
Dept: OTOLARYNGOLOGY | Facility: CLINIC | Age: 75
End: 2020-05-11

## 2020-05-11 NOTE — TELEPHONE ENCOUNTER
Called patient in regards to 5/21 voice therapy appointment and left a VM.     Informed them that Tamra Quintanilla will not be in clinic that day and will need to reschedule appointment. Explained to patient that he can schedule as a video or phone visit for the time being, or schedule out 8 weeks for in clinic appointment.     Patient is welcome to reschedule new/return video visit in any open slot on providers schedule and wait listed as desired.     If pt would like to do telephone visit for appt please confirm best number for contact. If pt would like video visit please confirm e-mail and let pt know a clinic staff member will reach out 2 days prior to help set up appointment.

## 2020-05-14 ENCOUNTER — TELEPHONE (OUTPATIENT)
Dept: OTOLARYNGOLOGY | Facility: CLINIC | Age: 75
End: 2020-05-14

## 2020-05-14 NOTE — TELEPHONE ENCOUNTER
Called patient in regards to 5/21 voice therapy appointment and left a VM.      Informed them that Tamra Quintanilla will not be in clinic that day so appt has been cancelled and he and will need to reschedule appointment. Explained to patient that he can schedule as a video or phone visit for the time being, or schedule mid June for in clinic appointment (subject to change).     Patient is welcome to schedule return video or telephone visit in any open slot on providers schedule and wait listed as desired.      If pt would like to do telephone visit for appt please confirm best number for contact. If pt would like video visit please confirm e-mail and/or phone.

## 2020-07-19 ENCOUNTER — HOSPITAL ENCOUNTER (EMERGENCY)
Facility: CLINIC | Age: 75
Discharge: HOME | End: 2020-07-20
Attending: EMERGENCY MEDICINE | Admitting: EMERGENCY MEDICINE
Payer: MEDICARE

## 2020-07-19 ENCOUNTER — APPOINTMENT (OUTPATIENT)
Dept: GENERAL RADIOLOGY | Facility: CLINIC | Age: 75
End: 2020-07-19
Attending: EMERGENCY MEDICINE
Payer: MEDICARE

## 2020-07-19 DIAGNOSIS — N39.0 URINARY TRACT INFECTION WITHOUT HEMATURIA, SITE UNSPECIFIED: ICD-10-CM

## 2020-07-19 DIAGNOSIS — Z20.822 SUSPECTED COVID-19 VIRUS INFECTION: ICD-10-CM

## 2020-07-19 LAB
ALBUMIN SERPL-MCNC: 3.8 G/DL (ref 3.4–5)
ALP SERPL-CCNC: 79 U/L (ref 40–150)
ALT SERPL W P-5'-P-CCNC: 63 U/L (ref 0–70)
ANION GAP SERPL CALCULATED.3IONS-SCNC: 6 MMOL/L (ref 3–14)
AST SERPL W P-5'-P-CCNC: 42 U/L (ref 0–45)
BASOPHILS # BLD AUTO: 0 10E9/L (ref 0–0.2)
BASOPHILS NFR BLD AUTO: 0.1 %
BILIRUB SERPL-MCNC: 1 MG/DL (ref 0.2–1.3)
BUN SERPL-MCNC: 21 MG/DL (ref 7–30)
CALCIUM SERPL-MCNC: 8.9 MG/DL (ref 8.5–10.1)
CHLORIDE SERPL-SCNC: 104 MMOL/L (ref 94–109)
CO2 SERPL-SCNC: 24 MMOL/L (ref 20–32)
CREAT SERPL-MCNC: 0.99 MG/DL (ref 0.66–1.25)
DIFFERENTIAL METHOD BLD: ABNORMAL
EOSINOPHIL # BLD AUTO: 0 10E9/L (ref 0–0.7)
EOSINOPHIL NFR BLD AUTO: 0.2 %
ERYTHROCYTE [DISTWIDTH] IN BLOOD BY AUTOMATED COUNT: 13.6 % (ref 10–15)
GFR SERPL CREATININE-BSD FRML MDRD: 75 ML/MIN/{1.73_M2}
GLUCOSE SERPL-MCNC: 115 MG/DL (ref 70–99)
HCT VFR BLD AUTO: 44 % (ref 40–53)
HGB BLD-MCNC: 15.6 G/DL (ref 13.3–17.7)
IMM GRANULOCYTES # BLD: 0 10E9/L (ref 0–0.4)
IMM GRANULOCYTES NFR BLD: 0.3 %
LACTATE BLD-SCNC: 1.2 MMOL/L (ref 0.7–2)
LYMPHOCYTES # BLD AUTO: 1.3 10E9/L (ref 0.8–5.3)
LYMPHOCYTES NFR BLD AUTO: 11.1 %
MCH RBC QN AUTO: 32.6 PG (ref 26.5–33)
MCHC RBC AUTO-ENTMCNC: 35.5 G/DL (ref 31.5–36.5)
MCV RBC AUTO: 92 FL (ref 78–100)
MONOCYTES # BLD AUTO: 1.1 10E9/L (ref 0–1.3)
MONOCYTES NFR BLD AUTO: 9.9 %
NEUTROPHILS # BLD AUTO: 9 10E9/L (ref 1.6–8.3)
NEUTROPHILS NFR BLD AUTO: 78.4 %
NRBC # BLD AUTO: 0 10*3/UL
NRBC BLD AUTO-RTO: 0 /100
PLATELET # BLD AUTO: 193 10E9/L (ref 150–450)
POTASSIUM SERPL-SCNC: 3.8 MMOL/L (ref 3.4–5.3)
PROT SERPL-MCNC: 7.4 G/DL (ref 6.8–8.8)
RBC # BLD AUTO: 4.78 10E12/L (ref 4.4–5.9)
SODIUM SERPL-SCNC: 134 MMOL/L (ref 133–144)
WBC # BLD AUTO: 11.5 10E9/L (ref 4–11)

## 2020-07-19 PROCEDURE — 84145 PROCALCITONIN (PCT): CPT | Performed by: EMERGENCY MEDICINE

## 2020-07-19 PROCEDURE — 85025 COMPLETE CBC W/AUTO DIFF WBC: CPT | Performed by: EMERGENCY MEDICINE

## 2020-07-19 PROCEDURE — 25000132 ZZH RX MED GY IP 250 OP 250 PS 637: Mod: GY | Performed by: EMERGENCY MEDICINE

## 2020-07-19 PROCEDURE — 25800030 ZZH RX IP 258 OP 636: Performed by: EMERGENCY MEDICINE

## 2020-07-19 PROCEDURE — 83605 ASSAY OF LACTIC ACID: CPT | Performed by: EMERGENCY MEDICINE

## 2020-07-19 PROCEDURE — C9803 HOPD COVID-19 SPEC COLLECT: HCPCS

## 2020-07-19 PROCEDURE — 96365 THER/PROPH/DIAG IV INF INIT: CPT

## 2020-07-19 PROCEDURE — 96361 HYDRATE IV INFUSION ADD-ON: CPT

## 2020-07-19 PROCEDURE — 87040 BLOOD CULTURE FOR BACTERIA: CPT | Performed by: EMERGENCY MEDICINE

## 2020-07-19 PROCEDURE — 71045 X-RAY EXAM CHEST 1 VIEW: CPT

## 2020-07-19 PROCEDURE — 80053 COMPREHEN METABOLIC PANEL: CPT | Performed by: EMERGENCY MEDICINE

## 2020-07-19 PROCEDURE — 99284 EMERGENCY DEPT VISIT MOD MDM: CPT | Mod: 25

## 2020-07-19 PROCEDURE — U0003 INFECTIOUS AGENT DETECTION BY NUCLEIC ACID (DNA OR RNA); SEVERE ACUTE RESPIRATORY SYNDROME CORONAVIRUS 2 (SARS-COV-2) (CORONAVIRUS DISEASE [COVID-19]), AMPLIFIED PROBE TECHNIQUE, MAKING USE OF HIGH THROUGHPUT TECHNOLOGIES AS DESCRIBED BY CMS-2020-01-R: HCPCS | Performed by: EMERGENCY MEDICINE

## 2020-07-19 RX ORDER — ACETAMINOPHEN 325 MG/1
975 TABLET ORAL ONCE
Status: DISCONTINUED | OUTPATIENT
Start: 2020-07-19 | End: 2020-07-20 | Stop reason: HOSPADM

## 2020-07-19 RX ORDER — IBUPROFEN 600 MG/1
600 TABLET, FILM COATED ORAL ONCE
Status: COMPLETED | OUTPATIENT
Start: 2020-07-19 | End: 2020-07-19

## 2020-07-19 RX ORDER — SODIUM CHLORIDE, SODIUM LACTATE, POTASSIUM CHLORIDE, CALCIUM CHLORIDE 600; 310; 30; 20 MG/100ML; MG/100ML; MG/100ML; MG/100ML
INJECTION, SOLUTION INTRAVENOUS CONTINUOUS
Status: DISCONTINUED | OUTPATIENT
Start: 2020-07-19 | End: 2020-07-20 | Stop reason: HOSPADM

## 2020-07-19 RX ADMIN — SODIUM CHLORIDE, POTASSIUM CHLORIDE, SODIUM LACTATE AND CALCIUM CHLORIDE 1000 ML: 600; 310; 30; 20 INJECTION, SOLUTION INTRAVENOUS at 22:55

## 2020-07-19 RX ADMIN — IBUPROFEN 600 MG: 600 TABLET ORAL at 23:15

## 2020-07-19 NOTE — ED AVS SNAPSHOT
Emergency Department  [address] [place] [address]  Phone:  [phone]  Fax:  [phone]                                    Juan Alberto Schultz   MRN: [id number]    Department:   Emergency Department   Date of Visit:  7/19/2020           After Visit Summary Signature Page    I have received my discharge instructions, and my questions have been answered. I have discussed any challenges I see with this plan with the nurse or doctor.    ..........................................................................................................................................  Patient/Patient Representative Signature      ..........................................................................................................................................  Patient Representative Print Name and Relationship to Patient    ..................................................               ................................................  Date                                   Time    ..........................................................................................................................................  Reviewed by Signature/Title    ...................................................              ..............................................  Date                                               Time          22EPIC Rev 08/18

## 2020-07-19 NOTE — LETTER
July 20, 2020        Juan Alberto Schultz  [address]   [place] [address]    This letter provides a written record that you were tested for COVID-19 on 7/19/20.       Your result was negative. This means that we didn t find the virus that causes COVID-19 in your sample. A test may show negative when you do actually have the virus. This can happen when the virus is in the early stages of infection, before you feel illness symptoms.    If you have symptoms   Stay home and away from others (self-isolate) until you meet ALL of the guidelines below:    You ve had no fever--and no medicine that reduces fever--for 3 full days (72 hours). And      Your other symptoms have gotten better. For example, your cough or breathing has improved. And     At least 10 days have passed since your symptoms started.    During this time:    Stay home. Don t go to work, school or anywhere else.     Stay in your own room, including for meals. Use your own bathroom if you can.    Stay away from others in your home. No hugging, kissing or shaking hands. No visitors.    Clean  high touch  surfaces often (doorknobs, counters, handles, etc.). Use a household cleaning spray or wipes. You can find a full list on the EPA website at www.epa.gov/pesticide-registration/list-n-disinfectants-use-against-sars-cov-2.    Cover your mouth and nose with a mask, tissue or washcloth to avoid spreading germs.    Wash your hands and face often with soap and water.    Going back to work  Check with your employer for any guidelines to follow for going back to work.    Employers: This document serves as formal notice that your employee tested negative for COVID-19, as of the testing date shown above.

## 2020-07-20 VITALS
TEMPERATURE: 99.5 F | BODY MASS INDEX: 30.71 KG/M2 | HEIGHT: 70 IN | DIASTOLIC BLOOD PRESSURE: 76 MMHG | OXYGEN SATURATION: 95 % | HEART RATE: 90 BPM | SYSTOLIC BLOOD PRESSURE: 129 MMHG | RESPIRATION RATE: 26 BRPM

## 2020-07-20 LAB
ALBUMIN UR-MCNC: 10 MG/DL
APPEARANCE UR: ABNORMAL
BACTERIA #/AREA URNS HPF: ABNORMAL /HPF
BILIRUB UR QL STRIP: NEGATIVE
COLOR UR AUTO: YELLOW
GLUCOSE UR STRIP-MCNC: NEGATIVE MG/DL
HGB UR QL STRIP: NEGATIVE
KETONES UR STRIP-MCNC: 10 MG/DL
LABORATORY COMMENT REPORT: NORMAL
LEUKOCYTE ESTERASE UR QL STRIP: ABNORMAL
MUCOUS THREADS #/AREA URNS LPF: PRESENT /LPF
NITRATE UR QL: POSITIVE
PH UR STRIP: 7 PH (ref 5–7)
PROCALCITONIN SERPL-MCNC: <0.05 NG/ML
RBC #/AREA URNS AUTO: 1 /HPF (ref 0–2)
SARS-COV-2 RNA SPEC QL NAA+PROBE: NEGATIVE
SARS-COV-2 RNA SPEC QL NAA+PROBE: NORMAL
SOURCE: ABNORMAL
SP GR UR STRIP: 1.02 (ref 1–1.03)
SPECIMEN SOURCE: NORMAL
SPECIMEN SOURCE: NORMAL
UROBILINOGEN UR STRIP-MCNC: NORMAL MG/DL (ref 0–2)
WBC #/AREA URNS AUTO: 32 /HPF (ref 0–5)

## 2020-07-20 PROCEDURE — 87086 URINE CULTURE/COLONY COUNT: CPT | Performed by: EMERGENCY MEDICINE

## 2020-07-20 PROCEDURE — 87186 SC STD MICRODIL/AGAR DIL: CPT | Performed by: EMERGENCY MEDICINE

## 2020-07-20 PROCEDURE — 81001 URINALYSIS AUTO W/SCOPE: CPT | Performed by: EMERGENCY MEDICINE

## 2020-07-20 PROCEDURE — 25000128 H RX IP 250 OP 636: Performed by: EMERGENCY MEDICINE

## 2020-07-20 PROCEDURE — 87088 URINE BACTERIA CULTURE: CPT | Performed by: EMERGENCY MEDICINE

## 2020-07-20 RX ORDER — CEFTRIAXONE 1 G/1
1 INJECTION, POWDER, FOR SOLUTION INTRAMUSCULAR; INTRAVENOUS ONCE
Status: COMPLETED | OUTPATIENT
Start: 2020-07-20 | End: 2020-07-20

## 2020-07-20 RX ORDER — CEPHALEXIN 500 MG/1
500 CAPSULE ORAL 2 TIMES DAILY
Qty: 14 CAPSULE | Refills: 0 | Status: SHIPPED | OUTPATIENT
Start: 2020-07-20 | End: 2020-07-27

## 2020-07-20 RX ADMIN — CEFTRIAXONE 1 G: 1 INJECTION, POWDER, FOR SOLUTION INTRAMUSCULAR; INTRAVENOUS at 01:10

## 2020-07-20 ASSESSMENT — ENCOUNTER SYMPTOMS
FEVER: 1
VOMITING: 0
SHORTNESS OF BREATH: 0
CHILLS: 1

## 2020-07-20 NOTE — ED PROVIDER NOTES
"  History     Chief Complaint:    Fever      HPI   Juan Alberto Schultz is a 75 year old male who presents with 1 to 2 days of fever and chills as well as significant malaise and fatigue.  He relays that he was potentially exposed to coronavirus by a neighbor recently.  He denies cough or shortness of breath.  There is been no changes in his medications, and he otherwise feels at his baseline.  There are no further aggravating or alleviating factors and no further associated symptoms.    Allergies:  Antihistamines  Valium     Medications:    Tylenol  Aspirin   Lipitor  Lisinopril   Senna-docusate    Past Medical History:    Essential hypertension   Hyperlipidemia  Generalized anxiety disorder   Acute cholecystitis   Migraines  Umbilical hernia    Past Surgical History:    Laparoscopic cholecystectomy   Orthopedic surgery     Family History:    Family history reviewed. No pertinent family history.    Social History:  The patient presented alone.  Smoking Status: Never Smoker  Smokeless Tobacco: Never Used  Alcohol Use: Negative  Drug Use: Negative  PCP: Reji Rain  Marital Status:        Review of Systems   Constitutional: Positive for chills and fever.   Respiratory: Negative for shortness of breath.    Gastrointestinal: Negative for vomiting.   Skin: Negative for rash.   All other systems reviewed and are negative.        Physical Exam     Patient Vitals for the past 24 hrs:   BP Temp Temp src Pulse SpO2 Height   07/19/20 2215 (!) 141/90 99.5  F (37.5  C) Oral 114 98 % 1.778 m (5' 10\")       Physical Exam   General: Alert, interactive in mild distress  Head:  Scalp is atraumatic  Eyes:  The pupils are equal, round, and reactive to light    EOM's intact    No scleral icterus  ENT:      Nose:  The external nose is normal  Ears:  External ears are normal  Mouth/Throat: The oropharynx is normal    Mucus membranes are dry      Neck:  Normal range of motion.      There is no rigidity.    Trachea is in the midline   "       CV:  Tachycardia    No murmur   Resp:  Breath sounds are clear bilaterally    Non-labored, no retractions or accessory muscle use      GI:  Abdomen is soft, no distension, no tenderness.       MS:  Normal strength in all 4 extremities  Skin:  Warm and dry, No rash or lesions noted.  Neuro: Strength 5/5 x4.  Sensation intact  In all 4 extremities.        Psych:  Awake. Alert.  Normal affect.      Appropriate interactions.    Emergency Department Course   Imaging:  Radiology findings were communicated with the patient who voiced understanding of the findings.    XR Chest Port 1 View  No evidence of active cardiopulmonary disease.    Reading per radiology     Laboratory:  Laboratory findings were communicated with the patient who voiced understanding of the findings.    CBC: WBC 11.5 (H), HGB 15.6,   CMP: glucose 115 (H) o/w WNL (Creatinine 0.99)  Lactic Acid (Resulted: 2322): 1.2  Procalcitonin: <0.05  UA reflex to microscopic and culture: urineketon 10 (!), protein albumin urine 10 (!), nitrite positive (!), leukocyte esterase urine large (!), wbc/hpf 32 (H), bacteria few (!), mucous present (!) o/w WNL.  Blood culture: pending  Symptomatic COVID-19 virus: pending  Urine culture aerobic bacterial: pending    Interventions:  2255 ns 1000 mL IV  2315 ibuprofen 600 mg PO  010 rocephin 1 g IV     Emergency Department Course:   Nursing notes and vitals reviewed. I performed an exam of the patient as documented above.     2235 The patient received a XR while in the emergency department, results above.     2250 IV was inserted and blood was drawn for laboratory testing, results above.    2300 A nasopharyngeal sample was obtained for laboratory testing as documented above.    0014 The patient provided a urine sample here in the emergency department. This was sent for laboratory testing, findings above.     Prior to discharge, I personally reviewed the results with the patient and all related questions were  answered. The patient verbalized understanding and is amenable to plan.     Findings and plan explained to the Patient. Patient discharged home with instructions regarding supportive care, medications, and reasons to return. The importance of close follow-up was reviewed. The patient was prescribed keflex.     Impression & Plan      Medical Decision Making:  Mr. Schultz was seen and evaluated, the above workup was undertaken. Findings are consistent with a UTI. No signs of severe sepsis or septic shock, or other concerning illness. Given the current pandemic he was tested for coronavirus and this remains in the differential as well. He received ceftriaxone here and I have discharged him with cephalexin. His abdominal exam is benign there is no tenderness to suggest pyelonephrosis. I discussed hospitalization verus discharge for home and the patient feels comfortable discharged. I think this is reasonable, however return if new symptoms develop.    Diagnosis:    ICD-10-CM    1. Urinary tract infection without hematuria, site unspecified  N39.0    2. Suspected COVID-19 virus infection  Z20.828      Disposition:   The patient is discharged to home.    Discharge Medications:  New Prescriptions    CEPHALEXIN (KEFLEX) 500 MG CAPSULE    Take 1 capsule (500 mg) by mouth 2 times daily for 7 days     Scribe Disclosure:  I, Teresa Moss, am serving as a scribe at 10:25 PM on 7/19/2020 to document services personally performed by Silviano Sandoval, based on my observations and the provider's statements to me.       Silviano Sandoval MD  07/20/20 0418

## 2020-07-21 LAB
BACTERIA SPEC CULT: ABNORMAL
Lab: ABNORMAL
SPECIMEN SOURCE: ABNORMAL

## 2020-07-26 LAB
BACTERIA SPEC CULT: NO GROWTH
SPECIMEN SOURCE: NORMAL

## 2020-08-07 ENCOUNTER — HOSPITAL ENCOUNTER (EMERGENCY)
Facility: CLINIC | Age: 75
Discharge: HOME | End: 2020-08-07
Attending: EMERGENCY MEDICINE | Admitting: EMERGENCY MEDICINE
Payer: MEDICARE

## 2020-08-07 VITALS
TEMPERATURE: 97.7 F | OXYGEN SATURATION: 98 % | HEART RATE: 89 BPM | RESPIRATION RATE: 20 BRPM | SYSTOLIC BLOOD PRESSURE: 131 MMHG | DIASTOLIC BLOOD PRESSURE: 74 MMHG

## 2020-08-07 DIAGNOSIS — Z20.822 SUSPECTED COVID-19 VIRUS INFECTION: ICD-10-CM

## 2020-08-07 DIAGNOSIS — M62.81 GENERALIZED MUSCLE WEAKNESS: ICD-10-CM

## 2020-08-07 LAB
ALBUMIN SERPL-MCNC: 3.7 G/DL (ref 3.4–5)
ALBUMIN UR-MCNC: 10 MG/DL
ALP SERPL-CCNC: 75 U/L (ref 40–150)
ALT SERPL W P-5'-P-CCNC: 78 U/L (ref 0–70)
ANION GAP SERPL CALCULATED.3IONS-SCNC: 6 MMOL/L (ref 3–14)
APPEARANCE UR: CLEAR
AST SERPL W P-5'-P-CCNC: 58 U/L (ref 0–45)
BASOPHILS # BLD AUTO: 0 10E9/L (ref 0–0.2)
BASOPHILS NFR BLD AUTO: 0.2 %
BILIRUB SERPL-MCNC: 0.8 MG/DL (ref 0.2–1.3)
BILIRUB UR QL STRIP: NEGATIVE
BUN SERPL-MCNC: 21 MG/DL (ref 7–30)
CALCIUM SERPL-MCNC: 8.4 MG/DL (ref 8.5–10.1)
CHLORIDE SERPL-SCNC: 99 MMOL/L (ref 94–109)
CO2 SERPL-SCNC: 21 MMOL/L (ref 20–32)
COLOR UR AUTO: YELLOW
CREAT SERPL-MCNC: 1.1 MG/DL (ref 0.66–1.25)
DIFFERENTIAL METHOD BLD: ABNORMAL
EOSINOPHIL # BLD AUTO: 0.2 10E9/L (ref 0–0.7)
EOSINOPHIL NFR BLD AUTO: 4.6 %
ERYTHROCYTE [DISTWIDTH] IN BLOOD BY AUTOMATED COUNT: 13.8 % (ref 10–15)
GFR SERPL CREATININE-BSD FRML MDRD: 66 ML/MIN/{1.73_M2}
GLUCOSE SERPL-MCNC: 138 MG/DL (ref 70–99)
GLUCOSE UR STRIP-MCNC: NEGATIVE MG/DL
HCT VFR BLD AUTO: 42.3 % (ref 40–53)
HGB BLD-MCNC: 14.8 G/DL (ref 13.3–17.7)
HGB UR QL STRIP: NEGATIVE
IMM GRANULOCYTES # BLD: 0 10E9/L (ref 0–0.4)
IMM GRANULOCYTES NFR BLD: 0.2 %
INTERPRETATION ECG - MUSE: NORMAL
KETONES UR STRIP-MCNC: 10 MG/DL
LEUKOCYTE ESTERASE UR QL STRIP: NEGATIVE
LYMPHOCYTES # BLD AUTO: 0.6 10E9/L (ref 0.8–5.3)
LYMPHOCYTES NFR BLD AUTO: 15.5 %
MCH RBC QN AUTO: 31.9 PG (ref 26.5–33)
MCHC RBC AUTO-ENTMCNC: 35 G/DL (ref 31.5–36.5)
MCV RBC AUTO: 91 FL (ref 78–100)
MONOCYTES # BLD AUTO: 0.3 10E9/L (ref 0–1.3)
MONOCYTES NFR BLD AUTO: 7.2 %
MUCOUS THREADS #/AREA URNS LPF: PRESENT /LPF
NEUTROPHILS # BLD AUTO: 3 10E9/L (ref 1.6–8.3)
NEUTROPHILS NFR BLD AUTO: 72.3 %
NITRATE UR QL: NEGATIVE
NRBC # BLD AUTO: 0 10*3/UL
NRBC BLD AUTO-RTO: 0 /100
PH UR STRIP: 5.5 PH (ref 5–7)
PLATELET # BLD AUTO: 192 10E9/L (ref 150–450)
POTASSIUM SERPL-SCNC: 4 MMOL/L (ref 3.4–5.3)
PROCALCITONIN SERPL-MCNC: <0.05 NG/ML
PROT SERPL-MCNC: 7.5 G/DL (ref 6.8–8.8)
RBC # BLD AUTO: 4.64 10E12/L (ref 4.4–5.9)
RBC #/AREA URNS AUTO: 0 /HPF (ref 0–2)
SODIUM SERPL-SCNC: 126 MMOL/L (ref 133–144)
SOURCE: ABNORMAL
SP GR UR STRIP: 1.02 (ref 1–1.03)
UROBILINOGEN UR STRIP-MCNC: NORMAL MG/DL (ref 0–2)
WBC # BLD AUTO: 4.1 10E9/L (ref 4–11)
WBC #/AREA URNS AUTO: 1 /HPF (ref 0–5)

## 2020-08-07 PROCEDURE — 99284 EMERGENCY DEPT VISIT MOD MDM: CPT | Mod: 25

## 2020-08-07 PROCEDURE — 93005 ELECTROCARDIOGRAM TRACING: CPT

## 2020-08-07 PROCEDURE — 81001 URINALYSIS AUTO W/SCOPE: CPT | Performed by: EMERGENCY MEDICINE

## 2020-08-07 PROCEDURE — 84145 PROCALCITONIN (PCT): CPT | Performed by: EMERGENCY MEDICINE

## 2020-08-07 PROCEDURE — 25800030 ZZH RX IP 258 OP 636: Performed by: EMERGENCY MEDICINE

## 2020-08-07 PROCEDURE — 80053 COMPREHEN METABOLIC PANEL: CPT | Performed by: EMERGENCY MEDICINE

## 2020-08-07 PROCEDURE — 87040 BLOOD CULTURE FOR BACTERIA: CPT | Mod: XU | Performed by: EMERGENCY MEDICINE

## 2020-08-07 PROCEDURE — U0003 INFECTIOUS AGENT DETECTION BY NUCLEIC ACID (DNA OR RNA); SEVERE ACUTE RESPIRATORY SYNDROME CORONAVIRUS 2 (SARS-COV-2) (CORONAVIRUS DISEASE [COVID-19]), AMPLIFIED PROBE TECHNIQUE, MAKING USE OF HIGH THROUGHPUT TECHNOLOGIES AS DESCRIBED BY CMS-2020-01-R: HCPCS | Performed by: EMERGENCY MEDICINE

## 2020-08-07 PROCEDURE — 51798 US URINE CAPACITY MEASURE: CPT

## 2020-08-07 PROCEDURE — 96360 HYDRATION IV INFUSION INIT: CPT

## 2020-08-07 PROCEDURE — 85025 COMPLETE CBC W/AUTO DIFF WBC: CPT | Performed by: EMERGENCY MEDICINE

## 2020-08-07 PROCEDURE — C9803 HOPD COVID-19 SPEC COLLECT: HCPCS

## 2020-08-07 RX ADMIN — SODIUM CHLORIDE 1000 ML: 9 INJECTION, SOLUTION INTRAVENOUS at 11:38

## 2020-08-07 ASSESSMENT — ENCOUNTER SYMPTOMS
FLANK PAIN: 0
NAUSEA: 1
VOMITING: 0
SHORTNESS OF BREATH: 1
FEVER: 0
BLOOD IN STOOL: 0
DIFFICULTY URINATING: 1
ABDOMINAL PAIN: 0
FREQUENCY: 1
WEAKNESS: 1
CONSTIPATION: 0
CHILLS: 1
RECTAL PAIN: 0
DIARRHEA: 0

## 2020-08-07 NOTE — ED PROVIDER NOTES
History     Chief Complaint:  Generalized Weakness    HPI   Juan Alberto Schultz is a 75 year old male who presents for evaluation of generalized weakness and urinary frequency. The patient reports being diagnosed with a urinary tract infection on 7/19 after experiencing similar weakness, frequency, and some flank pain. At that time, he was started on a 7 day course of Keflex. He did feel somewhat improved after finishing the antibiotic, however only three days after finishing, he reports the onset of chills and tremulousness. When these chills and shakes persisted and he started having acute dysuria, he called his primary clinic on 8/1 and started on a week course of Bactrim. Today is the last day of this antibiotic and he continues to feel weak and chilled. Last night, he reports the rigors were bad and he was noted to have a low grade temperature of 99.6 degrees around midnight. This morning, when he was unable to walk across a room without his wife's assistance, she told him to present to the ED for evaluation. Here, he reports concern that this is a relapse of the UTI. He reports urinary frequency, as well as significant nausea, but denies any sensation of incomplete void. He reports voiding approximately 200-300 mL of urine each time. He denies any stool changes, pain with stools, or significant abdominal pain. He also denies any chest pain. He does report intermittent shortness of breath, however he reports these feel similar to a panic attack. Additionally, he reports taking more ibuprofen than usual of late as his right shoulder has been bothering him a lot.     Allergies:  Antihistamines, Chlorpheniramine-Type [Alkylamines]  Valium [Diazepam]    Medications:    Lisinopril    Hydrochlorothiazide   Baby aspirin   Atorvastatin    Vitamin D   Senna docusate     Past Medical History:    Hypertension   Anxiety   Migraines   Umbilical hernia   Hyperlipidemia     Past Surgical History:    Cholecystectomy   Orthopedic  surgery     Family History:    No past pertinent family history.    Social History:  Marital Status:   [2]  Negative for tobacco use.  Negative for alcohol use.  Negative for drug use.      Review of Systems   Constitutional: Positive for chills. Negative for fever.   Respiratory: Positive for shortness of breath.    Gastrointestinal: Positive for nausea. Negative for abdominal pain, blood in stool, constipation, diarrhea, rectal pain and vomiting.   Genitourinary: Positive for difficulty urinating and frequency. Negative for decreased urine volume and flank pain.   Musculoskeletal:        Right shoulder pain   Neurological: Positive for weakness (global).   All other systems reviewed and are negative.      Physical Exam     Patient Vitals for the past 24 hrs:   BP Temp Temp src Pulse Heart Rate Resp SpO2   08/07/20 1430 131/74 -- -- 89 -- -- 98 %   08/07/20 1405 121/67 -- -- 94 -- -- 98 %   08/07/20 1330 125/76 -- -- 80 -- -- 99 %   08/07/20 1300 117/58 -- -- 88 -- -- 99 %   08/07/20 1230 112/67 -- -- 89 -- -- 97 %   08/07/20 1033 112/70 97.7  F (36.5  C) Oral 110 110 20 94 %      Physical Exam  Constitutional: Alert, attentive, GCS 15  HENT:    Nose: Nose normal.    Mouth/Throat: Oropharynx is clear, mucous membranes are moist  Eyes: EOM are normal, anicteric, conjugate gaze  CV: regular rate and rhythm; no murmurs  Chest: Effort normal and breath sounds clear without wheezing or rales, symmetric bilaterally   GI:  non tender. No distension. No guarding or rebound.    MSK: No LE edema, no tenderness to palpation of BLE.  Neurological: Alert, attentive, moving all extremities equally.   Skin: Skin is warm and dry.    Emergency Department Course   ECG:  Indication: Generalized Weakness  Time: 1208  Vent. Rate 88 bpm. NM interval 162. QRS duration 94. QT/QTc 360/435. P-R-T axis 47 33 16.    Normal sinus rhythm  Possible inferior infarct, age undetermined.  No significant change compared to EKG dated 10/4/12.  Read time: 1224.    Laboratory:  CBC: WBC: 4.1, HGB: 14.8, PLT: 192  CMP: Glucose 138 (H), Na: 126 (L), Ca: 8.4 (L), ALT: 78 (H), AST: 58 (H), o/w WNL (Creatinine: 1.10)    Procalcitonin: <0.05  Blood cultures x2: Pending     UA with Microscopic: Ketones: 10 (A), Protein albumin: 10 (A), Mucous: Present (A), o/w WNL     Symptomatic COVID-19 Virus PCR: Pending     Interventions:  1138 NS 1L IV     Emergency Department Course:  Nursing notes and vitals reviewed.   1134: I performed an exam of the patient as documented above.      IV was inserted and blood was drawn for laboratory testing, results above. Medicine administered as documented above.     1150: Patient reported having the urge to urinate. Bladder scan performed before void showed 147 mL of retained urine. Patient voided 75 mL total.     The patient provided a urine sample here in the emergency department. This was sent for laboratory testing, findings above.     EKG obtained in the ED, see results above.      1427: I rechecked the patient and discussed the results of his workup; he would prefer to go home so we will perform an ambulation trial.     1505: I rechecked the patient and he feels improved; discussed discharge instructions.     Findings and plan explained to the Patient. Patient discharged home with instructions regarding supportive care, medications, and reasons to return. The importance of close follow-up was reviewed.     I personally reviewed the laboratory results with the Patient and answered all related questions prior to discharge.    Impression & Plan      Covid-19  Juan Alberto Schultz was evaluated during a global COVID-19 pandemic, which necessitated consideration that the patient might be at risk for infection with the SARS-CoV-2 virus that causes COVID-19.   Applicable protocols for evaluation were followed during the patient's care.   COVID-19 was considered as part of the patient's evaluation. The plan for testing is:  a test was obtained  during this visit.    Medical Decision Making:  Juan Alberto Schultz is a 75 year old male past medical history significant for hypertension, anxiety, hyperlipidemia and UTI presenting for evaluation of generalized weakness and malaise.  Of note, patient was seen middle of last month diagnosed with UTI completed a course of Keflex with symptom recurrence for which she was initiated on Macrobid via virtual visit endorsing some urinary frequency today.  On arrival, he was mildly tachycardic though this resolved with IV fluids, he endorses low-grade fever with chills the night prior and infectious work-up was undertaken.  Chest x-ray here is clear, repeat UA here is unremarkable with no evidence of UTI.  He does not have a leukocytosis and pro-Marco is negative.  A repeat coronavirus test was sent given the current pandemic of note he did test -2 weeks prior but did have a possible exposure.  After IV fluids, patient did feel improved, he was walked around the emergency department without significant fatigue or difficulty breathing.  He was felt safe for discharge home after discussion of possible observation admission which he declined.  Return precautions were reviewed including recurrence of his fever, shaking chills or worsening malaise.  Home isolation was reviewed.    Diagnosis:    ICD-10-CM    1. Generalized muscle weakness  M62.81    2. Suspected COVID-19 virus infection  Z20.828        Disposition:  discharged to home    Abdon Alvarenga MD  [employer]  4:19 PM 08/07/20     Scribe Disclosure:  I, Swetha Blake, am serving as a scribe on 8/7/2020 at 11:34 AM to personally document services performed by Abdon Alvarenga MD based on my observations and the provider's statements to me.      8/7/2020    EMERGENCY DEPARTMENT       Abdon Alvarenga MD  08/07/20 1611

## 2020-08-07 NOTE — ED NOTES
[facility]  ED Nurse Handoff Report    ED Chief complaint: Generalized Weakness      ED Diagnosis:   Final diagnoses:   None       Code Status: Not addressed, confirm with hospitalist    Allergies:   Allergies   Allergen Reactions     Antihistamines, Chlorpheniramine-Type [Alkylamines]      Valium [Diazepam]        Patient Story: Pt from home where he lives with his wife. Pt reports bladder infection and taking 2 different antibiotics. Denies pain with urination currently, but is very weak with minimal activity and short of breath.  Focused Assessment:  Pt denies difficulty voiding and reports feeling like he is emptying out with voids. Bladders scanned prior to voiding for 174ml and voided 75ml. Reports prostate issues. States he is very weak and wife is having to assist him with ADLs. Pleasant and cooperative, alert and oriented.     Treatments and/or interventions provided: EKG, Labs UA  Patient's response to treatments and/or interventions: Tolerated well    To be done/followed up on inpatient unit:  Continue plan of care    Does this patient have any cognitive concerns?: None noted    Activity level - Baseline/Home:  Independent  Activity Level - Current:   Stand with Assist    Patient's Preferred language: English   Needed?: No    Isolation: None and Other: Covid PUI  Infection: Not Applicable  Bariatric?: No    Vital Signs:   Vitals:    08/07/20 1300 08/07/20 1330 08/07/20 1405 08/07/20 1430   BP: 117/58 125/76 121/67 131/74   Pulse: 88 80 94 89   Resp:       Temp:       TempSrc:       SpO2: 99% 99% 98% 98%       Cardiac Rhythm:     Was the PSS-3 completed:   Yes  What interventions are required if any?               Family Comments: not present  OBS brochure/video discussed/provided to patient/family: No              Name of person given brochure if not patient: n/a              Relationship to patient: n/a    For the majority of the shift this patient's behavior was Green.    Behavioral interventions performed were Updated on plan of care.    ED NURSE PHONE NUMBER: RN 1

## 2020-08-08 LAB
SARS-COV-2 RNA SPEC QL NAA+PROBE: NOT DETECTED
SPECIMEN SOURCE: NORMAL

## 2020-08-13 LAB
BACTERIA SPEC CULT: NO GROWTH
BACTERIA SPEC CULT: NO GROWTH
SPECIMEN SOURCE: NORMAL
SPECIMEN SOURCE: NORMAL

## 2021-01-15 ENCOUNTER — HEALTH MAINTENANCE LETTER (OUTPATIENT)
Age: 76
End: 2021-01-15

## 2021-09-05 ENCOUNTER — HOSPITAL ENCOUNTER (EMERGENCY)
Facility: CLINIC | Age: 76
Discharge: HOME | End: 2021-09-05
Attending: EMERGENCY MEDICINE | Admitting: EMERGENCY MEDICINE
Payer: MEDICARE

## 2021-09-05 ENCOUNTER — HEALTH MAINTENANCE LETTER (OUTPATIENT)
Age: 76
End: 2021-09-05

## 2021-09-05 VITALS
TEMPERATURE: 97.4 F | BODY MASS INDEX: 30.64 KG/M2 | SYSTOLIC BLOOD PRESSURE: 143 MMHG | HEART RATE: 104 BPM | DIASTOLIC BLOOD PRESSURE: 78 MMHG | RESPIRATION RATE: 14 BRPM | HEIGHT: 70 IN | WEIGHT: 214 LBS | OXYGEN SATURATION: 98 %

## 2021-09-05 DIAGNOSIS — R68.84 JAW PAIN: ICD-10-CM

## 2021-09-05 DIAGNOSIS — K08.89 DENTALGIA: ICD-10-CM

## 2021-09-05 PROCEDURE — 99283 EMERGENCY DEPT VISIT LOW MDM: CPT | Mod: 25

## 2021-09-05 PROCEDURE — 250N000009 HC RX 250: Performed by: EMERGENCY MEDICINE

## 2021-09-05 PROCEDURE — 64400 NJX AA&/STRD TRIGEMINAL NRV: CPT

## 2021-09-05 PROCEDURE — 250N000013 HC RX MED GY IP 250 OP 250 PS 637: Performed by: EMERGENCY MEDICINE

## 2021-09-05 RX ORDER — NAPROXEN 500 MG/1
500 TABLET ORAL 2 TIMES DAILY WITH MEALS
Qty: 24 TABLET | Refills: 0 | Status: SHIPPED | OUTPATIENT
Start: 2021-09-05 | End: 2021-09-13

## 2021-09-05 RX ORDER — BUPIVACAINE HYDROCHLORIDE AND EPINEPHRINE 5; 5 MG/ML; UG/ML
1.8 INJECTION, SOLUTION PERINEURAL ONCE
Status: COMPLETED | OUTPATIENT
Start: 2021-09-05 | End: 2021-09-05

## 2021-09-05 RX ORDER — PENICILLIN V POTASSIUM 500 MG/1
500 TABLET, FILM COATED ORAL ONCE
Status: COMPLETED | OUTPATIENT
Start: 2021-09-05 | End: 2021-09-05

## 2021-09-05 RX ORDER — PENICILLIN V POTASSIUM 500 MG/1
500 TABLET, FILM COATED ORAL 2 TIMES DAILY
Qty: 14 TABLET | Refills: 0 | Status: SHIPPED | OUTPATIENT
Start: 2021-09-05 | End: 2021-09-12

## 2021-09-05 RX ORDER — IBUPROFEN 600 MG/1
600 TABLET, FILM COATED ORAL ONCE
Status: COMPLETED | OUTPATIENT
Start: 2021-09-05 | End: 2021-09-05

## 2021-09-05 RX ADMIN — BUPIVACAINE HYDROCHLORIDE AND EPINEPHRINE BITARTRATE 1.8 ML: 5; .005 INJECTION, SOLUTION SUBCUTANEOUS at 02:57

## 2021-09-05 RX ADMIN — PENICILLIN V POTASSIUM 500 MG: 500 TABLET, FILM COATED ORAL at 02:57

## 2021-09-05 RX ADMIN — IBUPROFEN 600 MG: 600 TABLET ORAL at 02:57

## 2021-09-05 ASSESSMENT — MIFFLIN-ST. JEOR: SCORE: 1716.95

## 2021-09-05 ASSESSMENT — ENCOUNTER SYMPTOMS
FEVER: 0
TROUBLE SWALLOWING: 0

## 2021-09-05 NOTE — DISCHARGE INSTRUCTIONS
Discharge Instructions  Dental Pain    You have been seen today for a toothache. Your pain may be caused by an exposed nerve, an infection (pulpitis), a root abscess (pocket of pus), or other problems. You will need to see a dentist for a solution to your tooth problem. Emergency Department care is only to help control your problem until you can see a dentist; we cannot provide complete dental care.  Today, we did not find any sign that your toothache was caused by any dangerous or life-threatening condition, but sometimes symptoms develop over time and cannot be found during an emergency visit, so it is very important that you follow up with your dentist.      Generally, every Emergency Department visit should have a follow-up clinic visit with either a primary or a specialty clinic/provider. Please follow-up as instructed by your emergency provider today.    Return to the Emergency Department if:  You develop a new fever over 100.4 F.  You cannot open your mouth normally, cannot move your tongue well, or cannot swallow.  You have new or increased swelling of your face or neck.  You develop drainage of pus or foul smelling material from around your tooth.  What can I do to help myself?  Take any antibiotic the provider may have prescribed for you today.  Avoid very hot or very cold foods as both can cause pain.  Make an appointment to see a dentist as soon as possible. Dentists are generally not  on-staff  at hospitals so we cannot  refer  to you to dentist but we may be able to provide a list of dental clinics to help you.  If you were given a prescription for medicine here today, be sure to read all of the information (including the package insert) that comes with your prescription.  This will include important information about the medicine, its side effects, and any warnings that you need to know about.  The pharmacist who fills the prescription can provide more information and answer questions you may have  about the medicine.  If you have questions or concerns that the pharmacist cannot address, please call or return to the Emergency Department.   Remember that you can always come back to the Emergency Department if you are not able to see your regular provider in the amount of time listed above, if you get any new symptoms, or if there is anything that worries you.     Many of these clinics offer a sliding fee option for patients that qualify, and see patients on a walk-in or same day basis. Please call each clinic directly. As services, hours, fees and policies vary greatly.    [place]:  [facility]     [phone]  [facility] ([facility]) [phone]  [facility]  [phone]  [facility]      [phone]   [facility]    [phone]  [facility]  [phone]  [facility] [phone]  [facility]     [phone]  [facility]   [phone]  [facility] (cash only)   [phone]  [facility]    [phone] (adults)          [phone] (children)    [place]:  [facility]     [phone]; [phone]  [facility]     [phone]  [facility]     [phone]  [facility] (free, limited)    [phone]    Multiple Locations:  [facility]       [phone]

## 2021-09-05 NOTE — ED PROVIDER NOTES
"  History   Chief Complaint:  Dental Pain     The history is provided by the patient.      Juan Alberto Schultz is a 76 year old male with history of migraines, hypertension, hyperlipidemia and anxiety who presents with dental pain. Patient reports that at 2130 he had sudden onset of pain in his 2nd from the back upper left tooth. The tooth is not broken and patient denies fevers and trouble swallowing. States the pain has been worsening and that he has not taken any interventions for the pain. Endorses he will call his son, who is a dentist, in the morning but is seeking pain medications until then. Of note, he has been sick for the last 2 weeks but tested negative for Covid. He is vaccinated to Covid.     Review of Systems   Constitutional: Negative for fever.   HENT: Positive for dental problem. Negative for trouble swallowing.    All other systems reviewed and are negative.    Allergies:  Antihistamines, Chlorpheniramine-Type [Alkylamines]  Valium [Diazepam]    Medications:  Cardura   Hydrodiuril   Zestril   Lipitor     Past Medical History:    Migraines  Umbilical hernia  Hypertension  Anxiety  Hyperlipidemia     Past Surgical History:    Cholecystectomy    Social History:  Presents with his wife  Patient is     Physical Exam     Patient Vitals for the past 24 hrs:   BP Temp Temp src Pulse Resp SpO2 Height Weight   09/05/21 0141 (!) 143/78 97.4  F (36.3  C) Temporal 104 14 98 % 1.778 m (5' 10\") 97.1 kg (214 lb)       Physical Exam  General: Alert, interactive in mild distress  Head:  Scalp is atraumatic  Eyes:  The pupils are equal, round, and reactive to light    EOM's intact    No scleral icterus  ENT:      Nose:  The external nose is normal  Ears:  External ears are normal  Mouth/Throat: The oropharynx is normal    Mucus membranes are moist                          Tooth #14 has a gold cap and tender to percussion. No evidence of abscess.       Neck:  Normal range of motion.      There is no " rigidity.    Trachea is in the midline         CV:  Regular rate and rhythm    No murmur   Resp:  Breath sounds are clear bilaterally    Non-labored, no retractions or accessory muscle use. Airway widely patent.   MS:  Normal strength in all 4 extremities  Skin:  Warm and dry, No rash or lesions noted.  Neuro: Strength 5/5 x4.     GCS: 15  Psych:  Awake. Alert.  Normal affect.      Appropriate interactions.    Emergency Department Course     Procedures    Dental Block     INDICATIONS:  Dental Pain   LOCATION:  Tooth #14  ANESTHESIA: Local block using bupivacaine 0.5% w/ epi, total of 1.8 mLs   PROCEDURE NOTE: The patient tolerated the procedure well with good relief of discomfort and there were no complications.    Emergency Department Course:    Reviewed:  I reviewed nursing notes, vitals, past medical history and care everywhere    Assessments:  0225 I obtained history and examined the patient as noted above.   0406 I rechecked the patient and preformed a dental block.     Interventions:  0257 Advil, 600 mg, Oral            Veetid, 500 mg, Oral    Disposition:  The patient was discharged to home.     Impression & Plan   Medical Decision Making:  Juan Alberto Schultz is a 76 year old male who presents with dental pain as detailed above. Tooth #14 acutely tender to percussion.  There is no abscess detected around the tooth amenable to incision and drainage. There is no evidence of deep space infection of the neck or any sepsis-like syndrome. The patient was offered a dental block for pain control, see procedure not above. The patient was strongly advised to seek dental care as soon as possible.  Provided dental resources for patient. Reasons to return were outlined including fevers, facial swelling, difficulty opening/closing jaw, or other concerning symptoms develop. Patient agrees with the plan and all questions/concerns addressed prior to discharge home.      Diagnosis:    ICD-10-CM    1. Jaw pain  R68.84    2. Dentalgia   K08.89        Discharge Medications:  New Prescriptions    NAPROXEN (NAPROSYN) 500 MG TABLET    Take 1 tablet (500 mg) by mouth 2 times daily (with meals) for 8 days    PENICILLIN V (VEETID) 500 MG TABLET    Take 1 tablet (500 mg) by mouth 2 times daily for 7 days       Scribe Disclosure:  Waqas NORWOOD, am serving as a scribe at 2:34 AM on 9/5/2021 to document services personally performed by Silviano Sandoval MD based on my observations and the provider's statements to me.            Silviano Sandoval MD  09/05/21 0607

## 2021-09-05 NOTE — ED NOTES
md in for pt procedure. Pt dcd with rx and instructions. All questions answered. Pt home with wife.

## 2021-09-17 ENCOUNTER — APPOINTMENT (OUTPATIENT)
Dept: CT IMAGING | Facility: CLINIC | Age: 76
End: 2021-09-17
Attending: EMERGENCY MEDICINE
Payer: MEDICARE

## 2021-09-17 ENCOUNTER — HOSPITAL ENCOUNTER (EMERGENCY)
Facility: CLINIC | Age: 76
Discharge: HOME | End: 2021-09-17
Attending: EMERGENCY MEDICINE | Admitting: EMERGENCY MEDICINE
Payer: MEDICARE

## 2021-09-17 VITALS
DIASTOLIC BLOOD PRESSURE: 66 MMHG | TEMPERATURE: 97.3 F | HEART RATE: 85 BPM | HEIGHT: 69 IN | RESPIRATION RATE: 18 BRPM | WEIGHT: 193 LBS | SYSTOLIC BLOOD PRESSURE: 95 MMHG | OXYGEN SATURATION: 96 % | BODY MASS INDEX: 28.58 KG/M2

## 2021-09-17 DIAGNOSIS — L03.213 PRESEPTAL CELLULITIS OF LEFT EYE: ICD-10-CM

## 2021-09-17 LAB
ANION GAP SERPL CALCULATED.3IONS-SCNC: 4 MMOL/L (ref 3–14)
BASOPHILS # BLD AUTO: 0 10E3/UL (ref 0–0.2)
BASOPHILS NFR BLD AUTO: 1 %
BUN SERPL-MCNC: 31 MG/DL (ref 7–30)
CALCIUM SERPL-MCNC: 9.1 MG/DL (ref 8.5–10.1)
CHLORIDE BLD-SCNC: 106 MMOL/L (ref 94–109)
CO2 SERPL-SCNC: 26 MMOL/L (ref 20–32)
CREAT SERPL-MCNC: 0.89 MG/DL (ref 0.66–1.25)
CRP SERPL-MCNC: 4.8 MG/L (ref 0–8)
EOSINOPHIL # BLD AUTO: 0.3 10E3/UL (ref 0–0.7)
EOSINOPHIL NFR BLD AUTO: 4 %
ERYTHROCYTE [DISTWIDTH] IN BLOOD BY AUTOMATED COUNT: 14.1 % (ref 10–15)
GFR SERPL CREATININE-BSD FRML MDRD: 84 ML/MIN/1.73M2
GLUCOSE BLD-MCNC: 154 MG/DL (ref 70–99)
HCT VFR BLD AUTO: 39 % (ref 40–53)
HGB BLD-MCNC: 13.3 G/DL (ref 13.3–17.7)
IMM GRANULOCYTES # BLD: 0 10E3/UL
IMM GRANULOCYTES NFR BLD: 0 %
LYMPHOCYTES # BLD AUTO: 1.5 10E3/UL (ref 0.8–5.3)
LYMPHOCYTES NFR BLD AUTO: 24 %
MCH RBC QN AUTO: 31.6 PG (ref 26.5–33)
MCHC RBC AUTO-ENTMCNC: 34.1 G/DL (ref 31.5–36.5)
MCV RBC AUTO: 93 FL (ref 78–100)
MONOCYTES # BLD AUTO: 0.8 10E3/UL (ref 0–1.3)
MONOCYTES NFR BLD AUTO: 13 %
NEUTROPHILS # BLD AUTO: 3.6 10E3/UL (ref 1.6–8.3)
NEUTROPHILS NFR BLD AUTO: 58 %
NRBC # BLD AUTO: 0 10E3/UL
NRBC BLD AUTO-RTO: 0 /100
PLATELET # BLD AUTO: 214 10E3/UL (ref 150–450)
POTASSIUM BLD-SCNC: 3.7 MMOL/L (ref 3.4–5.3)
RBC # BLD AUTO: 4.21 10E6/UL (ref 4.4–5.9)
SODIUM SERPL-SCNC: 136 MMOL/L (ref 133–144)
WBC # BLD AUTO: 6.2 10E3/UL (ref 4–11)

## 2021-09-17 PROCEDURE — 86140 C-REACTIVE PROTEIN: CPT | Performed by: EMERGENCY MEDICINE

## 2021-09-17 PROCEDURE — 80048 BASIC METABOLIC PNL TOTAL CA: CPT | Performed by: EMERGENCY MEDICINE

## 2021-09-17 PROCEDURE — 250N000011 HC RX IP 250 OP 636: Performed by: EMERGENCY MEDICINE

## 2021-09-17 PROCEDURE — 250N000009 HC RX 250: Performed by: EMERGENCY MEDICINE

## 2021-09-17 PROCEDURE — 36415 COLL VENOUS BLD VENIPUNCTURE: CPT | Performed by: EMERGENCY MEDICINE

## 2021-09-17 PROCEDURE — 70481 CT ORBIT/EAR/FOSSA W/DYE: CPT | Mod: ME,XU

## 2021-09-17 PROCEDURE — 85025 COMPLETE CBC W/AUTO DIFF WBC: CPT | Performed by: EMERGENCY MEDICINE

## 2021-09-17 PROCEDURE — 250N000013 HC RX MED GY IP 250 OP 250 PS 637: Performed by: EMERGENCY MEDICINE

## 2021-09-17 PROCEDURE — 70450 CT HEAD/BRAIN W/O DYE: CPT | Mod: ME

## 2021-09-17 PROCEDURE — 99285 EMERGENCY DEPT VISIT HI MDM: CPT | Mod: 25

## 2021-09-17 RX ORDER — IOPAMIDOL 755 MG/ML
50 INJECTION, SOLUTION INTRAVASCULAR ONCE
Status: COMPLETED | OUTPATIENT
Start: 2021-09-17 | End: 2021-09-17

## 2021-09-17 RX ADMIN — IOPAMIDOL 50 ML: 755 INJECTION, SOLUTION INTRAVENOUS at 16:47

## 2021-09-17 RX ADMIN — SODIUM CHLORIDE 60 ML: 9 INJECTION, SOLUTION INTRAVENOUS at 16:47

## 2021-09-17 RX ADMIN — AMOXICILLIN AND CLAVULANATE POTASSIUM 1 TABLET: 875; 125 TABLET, FILM COATED ORAL at 17:46

## 2021-09-17 ASSESSMENT — MIFFLIN-ST. JEOR: SCORE: 1605.82

## 2021-09-17 NOTE — ED TRIAGE NOTES
"Patient reports tooth extraction for infected tooth on 9/6. Has finished penicillin course and still having significant jaw pain, as well as left eye pain, \"muddy vision\", and left-sided headache x3 days. Also reports feeling \"muddle-headed\" and \"woozy\". Denies dizziness/room spinning. Dentist told patient that he was concerned the infection may have spread into the left sinus. Denies acute neuro changes. All symptoms present > 3 days.   "

## 2021-09-17 NOTE — ED PROVIDER NOTES
"  History   Chief Complaint:  No chief complaint on file.     HPI   Juan Alberto Schultz is a 76 year old male with history of recent left upper dental extraction who presents with left thigh swelling and pain and feeling \"muddy\".    Patient is a 76-year-old male with a history of anxiety.  Patient is recently been seen in the ER for dental pain since he has had the left upper molar extracted.  Patient has developed some swelling and around the left eye.  Has had no fever or chills.  Patient has had increasing pain in this region.  He also feels \"muddy\".  He felt better when he was on antibiotics after the ER visit for dental pain.  Patient is felt generally weak.  No chest pain or shortness of breath no cough no urinary symptoms.  Patient is brought to the emergency room by family for further assessment..    Review of Systems  Negative for fever chills neck for chest pain or shortness of breath negative for abdominal pain vomiting or diarrhea positive for left periorbital pain all the systems negative except as above    Allergies:  Antihistamines, Chlorpheniramine-Type [Alkylamines]  Latex  Valium     Medications:  Aspirin 81 mg   Lipitor  Lisinopril   Senna-Docusate    Past Medical History:    Allergic state  Migraines  Umbilical hernia  Hypertension   Hyperlipidemia  DONNA  Acute cholecystitis     Past Surgical History:    Laparoscopic cholecystectomy  Orthopedic surgery     Family History:    The patient denies past family history.     Social History:  Patient presents to the ED with family denies alcohol or drug use    Physical Exam     Patient Vitals for the past 24 hrs:   BP Temp Temp src Pulse Resp SpO2 Height Weight   09/17/21 1223 107/73 97.3  F (36.3  C) Temporal 82 18 98 % 1.753 m (5' 9\") 87.5 kg (193 lb)       Physical Exam  Vitals and nursing note reviewed.   HENT:      Head: Normocephalic.      Right Ear: Tympanic membrane normal.      Left Ear: Tympanic membrane normal.      Nose: Nose normal.      " "Mouth/Throat:      Mouth: Mucous membranes are moist.   Eyes:      Extraocular Movements: Extraocular movements intact.      Pupils: Pupils are equal, round, and reactive to light.      Comments: No proptosis of the left eye.  There is a slight amount of erythema over the lateral canthus and upper eyelid.  There is no conjunctival injection pupils equally round.  No hyphema or hypopyon.   Cardiovascular:      Rate and Rhythm: Normal rate and regular rhythm.   Pulmonary:      Effort: Pulmonary effort is normal.      Breath sounds: Normal breath sounds.   Abdominal:      General: Abdomen is flat.      Palpations: Abdomen is soft.   Musculoskeletal:         General: Normal range of motion.      Cervical back: No rigidity or tenderness.   Skin:     General: Skin is warm.      Capillary Refill: Capillary refill takes less than 2 seconds.   Neurological:      General: No focal deficit present.      Mental Status: He is alert and oriented to person, place, and time.   Psychiatric:      Comments: Anxious appearing states he feels \"muddy\".           Emergency Department Course     Imaging:    CT Head w/o contrast:   1.  No acute intracranial process. Reading per radiology.     CT Orbital w contrast:   1. There has been presumed recent extraction of the posterior left maxillary tooth #14 with an empty tooth socket and adjacent previously present radicular cyst seen on the 10/3/2018 CTA head and neck study. There is some adjacent inflammatory stranding   and soft tissue thickening without evidence of an abscess.     2. There is mild periorbital soft tissue inflammation. No evidence of post septal extension. Reading per radiology.     Laboratory:    CBC: WBC 6.2, HGB 13.3,    CMP: Urea Nitrogen 31(H); Glucose 154(H) o/w WNL (Creatinine 0.89)   CRP Inflammation: 4.8    Emergency Department Course:    Reviewed:  I reviewed nursing notes, vitals, past medical history and care everywhere    Assessments:  1610 I obtained " history and examined the patient as noted above.   1800 I rechecked the patient and explained findings and negative CT scan and imaging..     Interventions:    Medications   iopamidol (ISOVUE-370) solution 50 mL (50 mLs Intravenous Given 9/17/21 1647)   Saline Flush (60 mLs Intravenous Given 9/17/21 1647)   amoxicillin-clavulanate (AUGMENTIN) 875-125 MG per tablet 1 tablet (1 tablet Oral Given 9/17/21 0126)     Disposition:  The patient was discharged to home.     Impression & Plan     Medical Decision Making:  Patient presents with left periorbital swelling and erythema.  Clinical presentation is highly suspicious for preseptal cellulitis patient had recent dental work.  Differential diagnosis expanded to include sinusitis left maxillary osteomyelitis and therefore imaging was recommended.  No clinical findings for orbital cellulitis patient CT scan is negative and only identifies preseptal cellulitis brain imaging was also performed due to patient's vague description of feeling body though he does not seem confused he is awake and alert with a GCS of 15.  He is felt generally weak lab work does not identify this.  Patient feels well enough to go home we will treat preseptal cellulitis with Augmentin and follow-up with his primary doctor for reassessment.  Patient was offered reassurance and discharged home.    Diagnosis:    ICD-10-CM    1. Preseptal cellulitis of left eye  L03.213        Discharge Medications:  Discharge Medication List as of 9/17/2021  5:42 PM      START taking these medications    Details   amoxicillin-clavulanate (AUGMENTIN) 875-125 MG tablet Take 1 tablet by mouth 2 times daily for 10 days, Disp-20 tablet, R-0, Local Print             Scribe Disclosure:  IBin, am serving as a scribe at 4:10 PM on 9/17/2021 to document services personally performed by Terrance Ballard MD, based on my observations and the provider's statements to me.     Scribe Disclosure:  IArtemio, am  serving as a scribe at 5:09 PM on 9/17/2021 to document services personally performed by Terrance Ballard MD, based on my observations and the provider's statements to me.              Terrance Ballard MD  09/19/21 8200

## 2021-09-17 NOTE — DISCHARGE INSTRUCTIONS
We have done imaging and lab work which are all normal.  We do identify a areas of inflammation in the front of the eye this could be a complication of your recent dental work please use antibiotics as prescribed please follow-up with your regular doctor if you do not start to feel well with the antibiotics if you develop worsening symptoms severe weakness turn to the emergency room for reassessment including fever greater than 100.4.

## 2022-01-18 ENCOUNTER — HOSPITAL ENCOUNTER (EMERGENCY)
Facility: CLINIC | Age: 77
Discharge: LEFT BEFORE DISCHARGE | End: 2022-01-18
Admitting: EMERGENCY MEDICINE
Payer: MEDICARE

## 2022-01-18 VITALS
SYSTOLIC BLOOD PRESSURE: 110 MMHG | OXYGEN SATURATION: 99 % | TEMPERATURE: 97.9 F | RESPIRATION RATE: 20 BRPM | DIASTOLIC BLOOD PRESSURE: 61 MMHG | HEART RATE: 80 BPM

## 2022-01-18 LAB
ALBUMIN SERPL-MCNC: 3.4 G/DL (ref 3.4–5)
ALP SERPL-CCNC: 84 U/L (ref 40–150)
ALT SERPL W P-5'-P-CCNC: 38 U/L (ref 0–70)
ANION GAP SERPL CALCULATED.3IONS-SCNC: 4 MMOL/L (ref 3–14)
AST SERPL W P-5'-P-CCNC: 33 U/L (ref 0–45)
BASOPHILS # BLD AUTO: 0 10E3/UL (ref 0–0.2)
BASOPHILS NFR BLD AUTO: 1 %
BILIRUB SERPL-MCNC: 0.6 MG/DL (ref 0.2–1.3)
BUN SERPL-MCNC: 27 MG/DL (ref 7–30)
CALCIUM SERPL-MCNC: 9.2 MG/DL (ref 8.5–10.1)
CHLORIDE BLD-SCNC: 103 MMOL/L (ref 94–109)
CO2 SERPL-SCNC: 26 MMOL/L (ref 20–32)
CREAT SERPL-MCNC: 0.97 MG/DL (ref 0.66–1.25)
EOSINOPHIL # BLD AUTO: 0.1 10E3/UL (ref 0–0.7)
EOSINOPHIL NFR BLD AUTO: 1 %
ERYTHROCYTE [DISTWIDTH] IN BLOOD BY AUTOMATED COUNT: 13.1 % (ref 10–15)
GFR SERPL CREATININE-BSD FRML MDRD: 82 ML/MIN/1.73M2
GLUCOSE BLD-MCNC: 136 MG/DL (ref 70–99)
HCT VFR BLD AUTO: 44.1 % (ref 40–53)
HGB BLD-MCNC: 14.5 G/DL (ref 13.3–17.7)
HOLD SPECIMEN: NORMAL
IMM GRANULOCYTES # BLD: 0 10E3/UL
IMM GRANULOCYTES NFR BLD: 0 %
LYMPHOCYTES # BLD AUTO: 1.1 10E3/UL (ref 0.8–5.3)
LYMPHOCYTES NFR BLD AUTO: 21 %
MCH RBC QN AUTO: 30.8 PG (ref 26.5–33)
MCHC RBC AUTO-ENTMCNC: 32.9 G/DL (ref 31.5–36.5)
MCV RBC AUTO: 94 FL (ref 78–100)
MONOCYTES # BLD AUTO: 0.7 10E3/UL (ref 0–1.3)
MONOCYTES NFR BLD AUTO: 13 %
NEUTROPHILS # BLD AUTO: 3.2 10E3/UL (ref 1.6–8.3)
NEUTROPHILS NFR BLD AUTO: 64 %
NRBC # BLD AUTO: 0 10E3/UL
NRBC BLD AUTO-RTO: 0 /100
PLATELET # BLD AUTO: 160 10E3/UL (ref 150–450)
POTASSIUM BLD-SCNC: 3.8 MMOL/L (ref 3.4–5.3)
PROT SERPL-MCNC: 7.1 G/DL (ref 6.8–8.8)
RBC # BLD AUTO: 4.71 10E6/UL (ref 4.4–5.9)
SODIUM SERPL-SCNC: 133 MMOL/L (ref 133–144)
TROPONIN I SERPL HS-MCNC: 9 NG/L
WBC # BLD AUTO: 5.1 10E3/UL (ref 4–11)

## 2022-01-18 PROCEDURE — 85025 COMPLETE CBC W/AUTO DIFF WBC: CPT | Performed by: EMERGENCY MEDICINE

## 2022-01-18 PROCEDURE — 80053 COMPREHEN METABOLIC PANEL: CPT | Performed by: EMERGENCY MEDICINE

## 2022-01-18 PROCEDURE — 84484 ASSAY OF TROPONIN QUANT: CPT | Performed by: EMERGENCY MEDICINE

## 2022-01-18 PROCEDURE — 82040 ASSAY OF SERUM ALBUMIN: CPT | Performed by: EMERGENCY MEDICINE

## 2022-01-18 PROCEDURE — 36415 COLL VENOUS BLD VENIPUNCTURE: CPT | Performed by: EMERGENCY MEDICINE

## 2022-01-18 PROCEDURE — 999N000104 HC STATISTIC NO CHARGE

## 2022-01-18 NOTE — ED TRIAGE NOTES
"Pt presents to triage with complaints of syncope that occurred at 0200. Pt states all day he has been feeling weak, near syncope and states \"struggling to stay conscious\" and starting crying. Pt states this occurred while he was in bed. Pt states he had this episode once when he had esophageal spasms and so this caused him to sob today. Pt denies chest pain. Pt states at this time he feels weak, slightly dizzy and like he may pass out.   "

## 2022-01-21 DIAGNOSIS — R55 SYNCOPE: Primary | ICD-10-CM

## 2022-02-20 ENCOUNTER — HEALTH MAINTENANCE LETTER (OUTPATIENT)
Age: 77
End: 2022-02-20

## 2022-02-25 ENCOUNTER — HOSPITAL ENCOUNTER (OUTPATIENT)
Dept: CARDIOLOGY | Facility: CLINIC | Age: 77
Discharge: HOME | End: 2022-02-25
Attending: FAMILY MEDICINE | Admitting: FAMILY MEDICINE
Payer: MEDICARE

## 2022-02-25 DIAGNOSIS — R55 SYNCOPE: ICD-10-CM

## 2022-02-25 LAB — LVEF ECHO: NORMAL

## 2022-02-25 PROCEDURE — 255N000002 HC RX 255 OP 636: Performed by: INTERNAL MEDICINE

## 2022-02-25 PROCEDURE — 999N000208 ECHOCARDIOGRAM COMPLETE

## 2022-02-25 PROCEDURE — 93306 TTE W/DOPPLER COMPLETE: CPT | Mod: 26 | Performed by: INTERNAL MEDICINE

## 2022-02-25 RX ADMIN — HUMAN ALBUMIN MICROSPHERES AND PERFLUTREN 9 ML: 10; .22 INJECTION, SOLUTION INTRAVENOUS at 08:27

## 2022-10-23 ENCOUNTER — HEALTH MAINTENANCE LETTER (OUTPATIENT)
Age: 77
End: 2022-10-23

## 2023-04-02 ENCOUNTER — HEALTH MAINTENANCE LETTER (OUTPATIENT)
Age: 78
End: 2023-04-02

## 2023-11-08 ENCOUNTER — TRANSFERRED RECORDS (OUTPATIENT)
Dept: MULTI SPECIALTY CLINIC | Facility: CLINIC | Age: 78
End: 2023-11-08

## 2023-11-08 LAB
ALT SERPL-CCNC: 28 U/L (ref 0–44)
AST SERPL-CCNC: 29 IU/L (ref 0–40)
CHOLESTEROL (EXTERNAL): 131 MG/DL (ref 100–199)
CREATININE (EXTERNAL): 0.92 MG/DL (ref 0.76–1.27)
GFR ESTIMATED (EXTERNAL): 86 ML/MIN/1.73M2
GLUCOSE (EXTERNAL): 109 MG/DL (ref 70–99)
HBA1C MFR BLD: 7.2 % (ref 4.8–5.6)
HDLC SERPL-MCNC: 53 MG/DL
LDL CHOLESTEROL CALCULATED (EXTERNAL): 62 MG/DL (ref 0–99)
POTASSIUM (EXTERNAL): 4 MMOL/L (ref 3.5–5.2)
TRIGLYCERIDES (EXTERNAL): 85 MG/DL (ref 0–149)
TSH SERPL-ACNC: 2.94 (ref 0.45–4.5)

## 2023-11-20 RX ORDER — CHOLECALCIFEROL (VITAMIN D3) 50 MCG
1 TABLET ORAL EVERY EVENING
COMMUNITY

## 2023-11-20 RX ORDER — TRIAMCINOLONE ACETONIDE 1 MG/G
CREAM TOPICAL 2 TIMES DAILY PRN
COMMUNITY

## 2023-11-20 RX ORDER — HYDROCHLOROTHIAZIDE 12.5 MG/1
12.5 TABLET ORAL EVERY EVENING
COMMUNITY

## 2023-12-18 ENCOUNTER — ANESTHESIA EVENT (OUTPATIENT)
Dept: SURGERY | Facility: CLINIC | Age: 78
End: 2023-12-18
Payer: MEDICARE

## 2023-12-18 RX ORDER — FLUOCINONIDE 0.5 MG/G
CREAM TOPICAL DAILY PRN
COMMUNITY

## 2023-12-18 NOTE — PROGRESS NOTES
PTA medications updated by Medication Scribe prior to surgery via phone call with patient (last doses completed by Nurse)     Medication history sources: Patient, Surescripts, and H&P  In the past week, patient estimated taking medication this percent of the time: Greater than 90%      Significant changes made to the medication list:  None      Additional medication history information:   None    Medication reconciliation completed by provider prior to medication history? No    Time spent in this activity: 30 MINUTES    The information provided in this note is only as accurate as the sources available at the time of update(s)      Prior to Admission medications    Medication Sig Last Dose Taking? Auth Provider Long Term End Date   aspirin 81 MG EC tablet Take 81 mg by mouth every evening  at PM Yes Reported, Patient     atorvastatin (LIPITOR) 40 MG tablet Take 40 mg by mouth every evening  at PM Yes Reported, Patient Yes    fluocinonide (LIDEX) 0.05 % external cream Apply topically daily as needed (ITCHING)  at PRN Yes Reported, Patient     hydrochlorothiazide (HYDRODIURIL) 12.5 MG tablet Take 12.5 mg by mouth every evening  at PM Yes Reported, Patient Yes    lisinopril (ZESTRIL) 20 MG tablet Take 20 mg by mouth every evening  at PM Yes Reported, Patient Yes    potassium 99 MG TABS Take 1 tablet by mouth every evening  at PM Yes Reported, Patient     triamcinolone (KENALOG) 0.1 % external cream Apply topically 2 times daily as needed (eczema flare ups)  at PRN Yes Reported, Patient     vitamin D3 (CHOLECALCIFEROL) 50 mcg (2000 units) tablet Take 1 tablet by mouth every evening  at PM Yes Reported, Patient         Medication history completed by: Sobia Ledesma

## 2023-12-19 ENCOUNTER — APPOINTMENT (OUTPATIENT)
Dept: GENERAL RADIOLOGY | Facility: CLINIC | Age: 78
End: 2023-12-19
Attending: ORTHOPAEDIC SURGERY
Payer: MEDICARE

## 2023-12-19 ENCOUNTER — HOSPITAL ENCOUNTER (OUTPATIENT)
Facility: CLINIC | Age: 78
Discharge: HOME | End: 2023-12-20
Attending: ORTHOPAEDIC SURGERY | Admitting: ORTHOPAEDIC SURGERY
Payer: MEDICARE

## 2023-12-19 ENCOUNTER — ANESTHESIA (OUTPATIENT)
Dept: SURGERY | Facility: CLINIC | Age: 78
End: 2023-12-19
Payer: MEDICARE

## 2023-12-19 DIAGNOSIS — Z96.652 S/P TOTAL KNEE ARTHROPLASTY, LEFT: Primary | ICD-10-CM

## 2023-12-19 LAB
ANION GAP SERPL CALCULATED.3IONS-SCNC: 9 MMOL/L (ref 7–15)
BUN SERPL-MCNC: 16.5 MG/DL (ref 8–23)
CALCIUM SERPL-MCNC: 9.4 MG/DL (ref 8.8–10.2)
CHLORIDE SERPL-SCNC: 102 MMOL/L (ref 98–107)
CREAT SERPL-MCNC: 0.91 MG/DL (ref 0.67–1.17)
DEPRECATED HCO3 PLAS-SCNC: 26 MMOL/L (ref 22–29)
EGFRCR SERPLBLD CKD-EPI 2021: 87 ML/MIN/1.73M2
ERYTHROCYTE [DISTWIDTH] IN BLOOD BY AUTOMATED COUNT: 13 % (ref 10–15)
GLUCOSE SERPL-MCNC: 103 MG/DL (ref 70–99)
HCT VFR BLD AUTO: 43.9 % (ref 40–53)
HGB BLD-MCNC: 14.8 G/DL (ref 13.3–17.7)
MCH RBC QN AUTO: 31.3 PG (ref 26.5–33)
MCHC RBC AUTO-ENTMCNC: 33.7 G/DL (ref 31.5–36.5)
MCV RBC AUTO: 93 FL (ref 78–100)
PLATELET # BLD AUTO: 179 10E3/UL (ref 150–450)
POTASSIUM SERPL-SCNC: 3.8 MMOL/L (ref 3.4–5.3)
RBC # BLD AUTO: 4.73 10E6/UL (ref 4.4–5.9)
SODIUM SERPL-SCNC: 137 MMOL/L (ref 135–145)
WBC # BLD AUTO: 5 10E3/UL (ref 4–11)

## 2023-12-19 PROCEDURE — C1713 ANCHOR/SCREW BN/BN,TIS/BN: HCPCS | Performed by: ORTHOPAEDIC SURGERY

## 2023-12-19 PROCEDURE — 258N000003 HC RX IP 258 OP 636: Performed by: PHYSICIAN ASSISTANT

## 2023-12-19 PROCEDURE — 258N000003 HC RX IP 258 OP 636: Performed by: NURSE ANESTHETIST, CERTIFIED REGISTERED

## 2023-12-19 PROCEDURE — 258N000003 HC RX IP 258 OP 636: Performed by: ANESTHESIOLOGY

## 2023-12-19 PROCEDURE — 250N000011 HC RX IP 250 OP 636: Mod: JZ | Performed by: NURSE ANESTHETIST, CERTIFIED REGISTERED

## 2023-12-19 PROCEDURE — 258N000001 HC RX 258: Performed by: ORTHOPAEDIC SURGERY

## 2023-12-19 PROCEDURE — 250N000009 HC RX 250: Performed by: ANESTHESIOLOGY

## 2023-12-19 PROCEDURE — 370N000017 HC ANESTHESIA TECHNICAL FEE, PER MIN: Performed by: ORTHOPAEDIC SURGERY

## 2023-12-19 PROCEDURE — 250N000009 HC RX 250: Performed by: NURSE ANESTHETIST, CERTIFIED REGISTERED

## 2023-12-19 PROCEDURE — 250N000013 HC RX MED GY IP 250 OP 250 PS 637: Performed by: PHYSICIAN ASSISTANT

## 2023-12-19 PROCEDURE — 250N000009 HC RX 250: Performed by: ORTHOPAEDIC SURGERY

## 2023-12-19 PROCEDURE — 250N000011 HC RX IP 250 OP 636: Mod: JZ | Performed by: ORTHOPAEDIC SURGERY

## 2023-12-19 PROCEDURE — 999N000065 XR KNEE PORT LEFT 1/2 VIEWS: Mod: LT

## 2023-12-19 PROCEDURE — 85041 AUTOMATED RBC COUNT: CPT | Performed by: ANESTHESIOLOGY

## 2023-12-19 PROCEDURE — 250N000011 HC RX IP 250 OP 636: Performed by: PHYSICIAN ASSISTANT

## 2023-12-19 PROCEDURE — 36415 COLL VENOUS BLD VENIPUNCTURE: CPT | Performed by: ANESTHESIOLOGY

## 2023-12-19 PROCEDURE — 360N000077 HC SURGERY LEVEL 4, PER MIN: Performed by: ORTHOPAEDIC SURGERY

## 2023-12-19 PROCEDURE — 250N000013 HC RX MED GY IP 250 OP 250 PS 637: Performed by: ORTHOPAEDIC SURGERY

## 2023-12-19 PROCEDURE — C1776 JOINT DEVICE (IMPLANTABLE): HCPCS | Performed by: ORTHOPAEDIC SURGERY

## 2023-12-19 PROCEDURE — 999N000141 HC STATISTIC PRE-PROCEDURE NURSING ASSESSMENT: Performed by: ORTHOPAEDIC SURGERY

## 2023-12-19 PROCEDURE — 272N000001 HC OR GENERAL SUPPLY STERILE: Performed by: ORTHOPAEDIC SURGERY

## 2023-12-19 PROCEDURE — 710N000009 HC RECOVERY PHASE 1, LEVEL 1, PER MIN: Performed by: ORTHOPAEDIC SURGERY

## 2023-12-19 PROCEDURE — 250N000011 HC RX IP 250 OP 636: Performed by: ANESTHESIOLOGY

## 2023-12-19 PROCEDURE — 80048 BASIC METABOLIC PNL TOTAL CA: CPT | Performed by: ANESTHESIOLOGY

## 2023-12-19 DEVICE — LEGION POSTERIOR STABILIZED HIGH                                    FLEX HIGHLY CROSS LINKED                                    POLYETHYLENE SIZE 5-6 13MM
Type: IMPLANTABLE DEVICE | Site: KNEE | Status: FUNCTIONAL
Brand: LEGION

## 2023-12-19 DEVICE — GENESIS II RESURFACING PATELLAR 35MM
Type: IMPLANTABLE DEVICE | Site: KNEE | Status: FUNCTIONAL
Brand: GENESIS II

## 2023-12-19 DEVICE — SIMPLEX® HV IS A FAST-SETTING ACRYLIC RESIN FOR USE IN BONE SURGERY. MIXING THE TWO SEPARATE STERILE COMPONENTS PRODUCES A DUCTILE BONE CEMENT WHICH, AFTER HARDENING, FIXES THE IMPLANT AND TRANSFERS STRESSES PRODUCED DURING MOVEMENT EVENLY TO THE BONE. SIMPLEX® HV CEMENT POWDER ALSO CONTAINS INSOLUBLE ZIRCONIUM DIOXIDE AS AN X-RAY CONTRAST MEDIUM. SIMPLEX® HV DOES NOT EMIT A SIGNAL AND DOES NOT POSE A SAFETY RISK IN A MAGNETIC RESONANCE ENVIRONMENT.
Type: IMPLANTABLE DEVICE | Site: KNEE | Status: FUNCTIONAL
Brand: SIMPLEX HV

## 2023-12-19 DEVICE — LEGION PS NONPOROUS FEMORAL SZ 7 LT
Type: IMPLANTABLE DEVICE | Site: KNEE | Status: FUNCTIONAL
Brand: LEGION

## 2023-12-19 DEVICE — GENESIS II NON-POROUS TIBIAL                                    BASEPLATE SIZE 5 LEFT
Type: IMPLANTABLE DEVICE | Site: KNEE | Status: FUNCTIONAL
Brand: GENESIS II

## 2023-12-19 RX ORDER — HYDROCHLOROTHIAZIDE 12.5 MG/1
12.5 TABLET ORAL EVERY EVENING
Status: DISCONTINUED | OUTPATIENT
Start: 2023-12-19 | End: 2023-12-20 | Stop reason: HOSPADM

## 2023-12-19 RX ORDER — SODIUM CHLORIDE, SODIUM LACTATE, POTASSIUM CHLORIDE, CALCIUM CHLORIDE 600; 310; 30; 20 MG/100ML; MG/100ML; MG/100ML; MG/100ML
INJECTION, SOLUTION INTRAVENOUS CONTINUOUS
Status: DISCONTINUED | OUTPATIENT
Start: 2023-12-19 | End: 2023-12-20 | Stop reason: HOSPADM

## 2023-12-19 RX ORDER — NALOXONE HYDROCHLORIDE 0.4 MG/ML
0.2 INJECTION, SOLUTION INTRAMUSCULAR; INTRAVENOUS; SUBCUTANEOUS
Status: DISCONTINUED | OUTPATIENT
Start: 2023-12-19 | End: 2023-12-20 | Stop reason: HOSPADM

## 2023-12-19 RX ORDER — CEFAZOLIN SODIUM/WATER 2 G/20 ML
2 SYRINGE (ML) INTRAVENOUS SEE ADMIN INSTRUCTIONS
Status: DISCONTINUED | OUTPATIENT
Start: 2023-12-19 | End: 2023-12-19 | Stop reason: HOSPADM

## 2023-12-19 RX ORDER — ONDANSETRON 2 MG/ML
4 INJECTION INTRAMUSCULAR; INTRAVENOUS EVERY 30 MIN PRN
Status: DISCONTINUED | OUTPATIENT
Start: 2023-12-19 | End: 2023-12-19 | Stop reason: HOSPADM

## 2023-12-19 RX ORDER — ACETAMINOPHEN 325 MG/1
650 TABLET ORAL EVERY 4 HOURS PRN
Status: DISCONTINUED | OUTPATIENT
Start: 2023-12-22 | End: 2023-12-20 | Stop reason: HOSPADM

## 2023-12-19 RX ORDER — NALOXONE HYDROCHLORIDE 0.4 MG/ML
0.4 INJECTION, SOLUTION INTRAMUSCULAR; INTRAVENOUS; SUBCUTANEOUS
Status: DISCONTINUED | OUTPATIENT
Start: 2023-12-19 | End: 2023-12-20 | Stop reason: HOSPADM

## 2023-12-19 RX ORDER — HYDROMORPHONE HCL IN WATER/PF 6 MG/30 ML
0.4 PATIENT CONTROLLED ANALGESIA SYRINGE INTRAVENOUS
Status: DISCONTINUED | OUTPATIENT
Start: 2023-12-19 | End: 2023-12-20 | Stop reason: HOSPADM

## 2023-12-19 RX ORDER — ONDANSETRON 2 MG/ML
INJECTION INTRAMUSCULAR; INTRAVENOUS PRN
Status: DISCONTINUED | OUTPATIENT
Start: 2023-12-19 | End: 2023-12-19

## 2023-12-19 RX ORDER — HYDROMORPHONE HCL IN WATER/PF 6 MG/30 ML
0.2 PATIENT CONTROLLED ANALGESIA SYRINGE INTRAVENOUS EVERY 5 MIN PRN
Status: DISCONTINUED | OUTPATIENT
Start: 2023-12-19 | End: 2023-12-19 | Stop reason: HOSPADM

## 2023-12-19 RX ORDER — DEXAMETHASONE SODIUM PHOSPHATE 4 MG/ML
INJECTION, SOLUTION INTRA-ARTICULAR; INTRALESIONAL; INTRAMUSCULAR; INTRAVENOUS; SOFT TISSUE PRN
Status: DISCONTINUED | OUTPATIENT
Start: 2023-12-19 | End: 2023-12-19

## 2023-12-19 RX ORDER — ATORVASTATIN CALCIUM 40 MG/1
40 TABLET, FILM COATED ORAL EVERY EVENING
Status: DISCONTINUED | OUTPATIENT
Start: 2023-12-19 | End: 2023-12-20 | Stop reason: HOSPADM

## 2023-12-19 RX ORDER — LIDOCAINE 40 MG/G
CREAM TOPICAL
Status: DISCONTINUED | OUTPATIENT
Start: 2023-12-19 | End: 2023-12-20 | Stop reason: HOSPADM

## 2023-12-19 RX ORDER — ONDANSETRON 2 MG/ML
4 INJECTION INTRAMUSCULAR; INTRAVENOUS EVERY 6 HOURS PRN
Status: DISCONTINUED | OUTPATIENT
Start: 2023-12-19 | End: 2023-12-20 | Stop reason: HOSPADM

## 2023-12-19 RX ORDER — LISINOPRIL 20 MG/1
20 TABLET ORAL EVERY EVENING
Status: DISCONTINUED | OUTPATIENT
Start: 2023-12-19 | End: 2023-12-20 | Stop reason: HOSPADM

## 2023-12-19 RX ORDER — PROCHLORPERAZINE MALEATE 5 MG
5 TABLET ORAL EVERY 6 HOURS PRN
Status: DISCONTINUED | OUTPATIENT
Start: 2023-12-19 | End: 2023-12-20 | Stop reason: HOSPADM

## 2023-12-19 RX ORDER — VANCOMYCIN HYDROCHLORIDE 1 G/20ML
INJECTION, POWDER, LYOPHILIZED, FOR SOLUTION INTRAVENOUS PRN
Status: DISCONTINUED | OUTPATIENT
Start: 2023-12-19 | End: 2023-12-19 | Stop reason: HOSPADM

## 2023-12-19 RX ORDER — TRANEXAMIC ACID 650 MG/1
1950 TABLET ORAL ONCE
Status: COMPLETED | OUTPATIENT
Start: 2023-12-19 | End: 2023-12-19

## 2023-12-19 RX ORDER — ACETAMINOPHEN 325 MG/1
975 TABLET ORAL EVERY 8 HOURS
Qty: 27 TABLET | Refills: 0 | Status: DISCONTINUED | OUTPATIENT
Start: 2023-12-19 | End: 2023-12-20 | Stop reason: HOSPADM

## 2023-12-19 RX ORDER — ACETAMINOPHEN 325 MG/1
650 TABLET ORAL EVERY 4 HOURS PRN
Qty: 100 TABLET | Refills: 0 | Status: SHIPPED | OUTPATIENT
Start: 2023-12-19

## 2023-12-19 RX ORDER — MAGNESIUM HYDROXIDE 1200 MG/15ML
LIQUID ORAL PRN
Status: DISCONTINUED | OUTPATIENT
Start: 2023-12-19 | End: 2023-12-19 | Stop reason: HOSPADM

## 2023-12-19 RX ORDER — HYDROMORPHONE HCL IN WATER/PF 6 MG/30 ML
0.4 PATIENT CONTROLLED ANALGESIA SYRINGE INTRAVENOUS EVERY 5 MIN PRN
Status: DISCONTINUED | OUTPATIENT
Start: 2023-12-19 | End: 2023-12-19 | Stop reason: HOSPADM

## 2023-12-19 RX ORDER — AMOXICILLIN 250 MG
1-2 CAPSULE ORAL 2 TIMES DAILY
Qty: 30 TABLET | Refills: 0 | Status: SHIPPED | OUTPATIENT
Start: 2023-12-19

## 2023-12-19 RX ORDER — ASPIRIN 81 MG/1
81 TABLET ORAL 2 TIMES DAILY
Qty: 60 TABLET | Refills: 0 | Status: SHIPPED | OUTPATIENT
Start: 2023-12-19

## 2023-12-19 RX ORDER — CEFAZOLIN SODIUM 2 G/100ML
2 INJECTION, SOLUTION INTRAVENOUS EVERY 8 HOURS
Qty: 200 ML | Refills: 0 | Status: COMPLETED | OUTPATIENT
Start: 2023-12-19 | End: 2023-12-20

## 2023-12-19 RX ORDER — DEXMEDETOMIDINE HYDROCHLORIDE 4 UG/ML
INJECTION, SOLUTION INTRAVENOUS PRN
Status: DISCONTINUED | OUTPATIENT
Start: 2023-12-19 | End: 2023-12-19

## 2023-12-19 RX ORDER — LIDOCAINE HYDROCHLORIDE 20 MG/ML
INJECTION, SOLUTION INFILTRATION; PERINEURAL PRN
Status: DISCONTINUED | OUTPATIENT
Start: 2023-12-19 | End: 2023-12-19

## 2023-12-19 RX ORDER — BISACODYL 10 MG
10 SUPPOSITORY, RECTAL RECTAL DAILY PRN
Status: DISCONTINUED | OUTPATIENT
Start: 2023-12-19 | End: 2023-12-20 | Stop reason: HOSPADM

## 2023-12-19 RX ORDER — SODIUM CHLORIDE, SODIUM LACTATE, POTASSIUM CHLORIDE, CALCIUM CHLORIDE 600; 310; 30; 20 MG/100ML; MG/100ML; MG/100ML; MG/100ML
INJECTION, SOLUTION INTRAVENOUS CONTINUOUS
Status: DISCONTINUED | OUTPATIENT
Start: 2023-12-19 | End: 2023-12-19 | Stop reason: HOSPADM

## 2023-12-19 RX ORDER — AMOXICILLIN 250 MG
1 CAPSULE ORAL 2 TIMES DAILY
Status: DISCONTINUED | OUTPATIENT
Start: 2023-12-19 | End: 2023-12-20 | Stop reason: HOSPADM

## 2023-12-19 RX ORDER — LABETALOL HYDROCHLORIDE 5 MG/ML
10 INJECTION, SOLUTION INTRAVENOUS
Status: DISCONTINUED | OUTPATIENT
Start: 2023-12-19 | End: 2023-12-19 | Stop reason: HOSPADM

## 2023-12-19 RX ORDER — HYDROMORPHONE HCL IN WATER/PF 6 MG/30 ML
0.2 PATIENT CONTROLLED ANALGESIA SYRINGE INTRAVENOUS
Status: DISCONTINUED | OUTPATIENT
Start: 2023-12-19 | End: 2023-12-20 | Stop reason: HOSPADM

## 2023-12-19 RX ORDER — HYDROXYZINE HYDROCHLORIDE 10 MG/1
10 TABLET, FILM COATED ORAL EVERY 6 HOURS PRN
Status: DISCONTINUED | OUTPATIENT
Start: 2023-12-19 | End: 2023-12-20 | Stop reason: HOSPADM

## 2023-12-19 RX ORDER — FENTANYL CITRATE 50 UG/ML
25 INJECTION, SOLUTION INTRAMUSCULAR; INTRAVENOUS EVERY 5 MIN PRN
Status: DISCONTINUED | OUTPATIENT
Start: 2023-12-19 | End: 2023-12-19 | Stop reason: HOSPADM

## 2023-12-19 RX ORDER — ONDANSETRON 4 MG/1
4 TABLET, ORALLY DISINTEGRATING ORAL EVERY 6 HOURS PRN
Status: DISCONTINUED | OUTPATIENT
Start: 2023-12-19 | End: 2023-12-20 | Stop reason: HOSPADM

## 2023-12-19 RX ORDER — HYDROMORPHONE HYDROCHLORIDE 2 MG/1
2 TABLET ORAL EVERY 4 HOURS PRN
Status: DISCONTINUED | OUTPATIENT
Start: 2023-12-19 | End: 2023-12-20 | Stop reason: HOSPADM

## 2023-12-19 RX ORDER — HYDROMORPHONE HYDROCHLORIDE 2 MG/1
2-4 TABLET ORAL EVERY 4 HOURS PRN
Qty: 31 TABLET | Refills: 0 | Status: SHIPPED | OUTPATIENT
Start: 2023-12-19 | End: 2023-12-20

## 2023-12-19 RX ORDER — ONDANSETRON 4 MG/1
4 TABLET, ORALLY DISINTEGRATING ORAL EVERY 30 MIN PRN
Status: DISCONTINUED | OUTPATIENT
Start: 2023-12-19 | End: 2023-12-19 | Stop reason: HOSPADM

## 2023-12-19 RX ORDER — POLYETHYLENE GLYCOL 3350 17 G/17G
17 POWDER, FOR SOLUTION ORAL DAILY
Status: DISCONTINUED | OUTPATIENT
Start: 2023-12-20 | End: 2023-12-20 | Stop reason: HOSPADM

## 2023-12-19 RX ORDER — ASPIRIN 81 MG/1
81 TABLET ORAL 2 TIMES DAILY
Status: DISCONTINUED | OUTPATIENT
Start: 2023-12-19 | End: 2023-12-20 | Stop reason: HOSPADM

## 2023-12-19 RX ORDER — CEFAZOLIN SODIUM/WATER 2 G/20 ML
2 SYRINGE (ML) INTRAVENOUS
Status: COMPLETED | OUTPATIENT
Start: 2023-12-19 | End: 2023-12-19

## 2023-12-19 RX ORDER — HYDROMORPHONE HYDROCHLORIDE 2 MG/1
4 TABLET ORAL EVERY 4 HOURS PRN
Status: DISCONTINUED | OUTPATIENT
Start: 2023-12-19 | End: 2023-12-20 | Stop reason: HOSPADM

## 2023-12-19 RX ORDER — EPHEDRINE SULFATE 50 MG/ML
INJECTION, SOLUTION INTRAMUSCULAR; INTRAVENOUS; SUBCUTANEOUS PRN
Status: DISCONTINUED | OUTPATIENT
Start: 2023-12-19 | End: 2023-12-19

## 2023-12-19 RX ORDER — FENTANYL CITRATE 50 UG/ML
50 INJECTION, SOLUTION INTRAMUSCULAR; INTRAVENOUS EVERY 5 MIN PRN
Status: DISCONTINUED | OUTPATIENT
Start: 2023-12-19 | End: 2023-12-19 | Stop reason: HOSPADM

## 2023-12-19 RX ORDER — PROPOFOL 10 MG/ML
INJECTION, EMULSION INTRAVENOUS CONTINUOUS PRN
Status: DISCONTINUED | OUTPATIENT
Start: 2023-12-19 | End: 2023-12-19

## 2023-12-19 RX ADMIN — PHENYLEPHRINE HYDROCHLORIDE 100 MCG: 10 INJECTION INTRAVENOUS at 14:51

## 2023-12-19 RX ADMIN — PROPOFOL 150 MCG/KG/MIN: 10 INJECTION, EMULSION INTRAVENOUS at 13:19

## 2023-12-19 RX ADMIN — ASPIRIN 81 MG: 81 TABLET, COATED ORAL at 20:37

## 2023-12-19 RX ADMIN — CEFAZOLIN SODIUM 2 G: 2 INJECTION, SOLUTION INTRAVENOUS at 20:37

## 2023-12-19 RX ADMIN — DEXAMETHASONE SODIUM PHOSPHATE 10 MG: 4 INJECTION, SOLUTION INTRA-ARTICULAR; INTRALESIONAL; INTRAMUSCULAR; INTRAVENOUS; SOFT TISSUE at 13:25

## 2023-12-19 RX ADMIN — HYDROMORPHONE HYDROCHLORIDE 2 MG: 2 TABLET ORAL at 21:15

## 2023-12-19 RX ADMIN — ATORVASTATIN CALCIUM 40 MG: 40 TABLET, FILM COATED ORAL at 20:37

## 2023-12-19 RX ADMIN — SODIUM CHLORIDE, POTASSIUM CHLORIDE, SODIUM LACTATE AND CALCIUM CHLORIDE: 600; 310; 30; 20 INJECTION, SOLUTION INTRAVENOUS at 15:12

## 2023-12-19 RX ADMIN — HYDROCHLOROTHIAZIDE 12.5 MG: 12.5 TABLET ORAL at 20:37

## 2023-12-19 RX ADMIN — PHENYLEPHRINE HYDROCHLORIDE 0.3 MCG/KG/MIN: 10 INJECTION INTRAVENOUS at 13:19

## 2023-12-19 RX ADMIN — ONDANSETRON 4 MG: 2 INJECTION INTRAMUSCULAR; INTRAVENOUS at 14:50

## 2023-12-19 RX ADMIN — DOCUSATE SODIUM 50 MG AND SENNOSIDES 8.6 MG 1 TABLET: 8.6; 5 TABLET, FILM COATED ORAL at 20:37

## 2023-12-19 RX ADMIN — Medication 2 G: at 13:11

## 2023-12-19 RX ADMIN — FENTANYL CITRATE 25 MCG: 50 INJECTION, SOLUTION INTRAMUSCULAR; INTRAVENOUS at 16:19

## 2023-12-19 RX ADMIN — DEXMEDETOMIDINE HYDROCHLORIDE 8 MCG: 200 INJECTION INTRAVENOUS at 13:11

## 2023-12-19 RX ADMIN — FENTANYL CITRATE 25 MCG: 50 INJECTION, SOLUTION INTRAMUSCULAR; INTRAVENOUS at 16:24

## 2023-12-19 RX ADMIN — SODIUM CHLORIDE, POTASSIUM CHLORIDE, SODIUM LACTATE AND CALCIUM CHLORIDE: 600; 310; 30; 20 INJECTION, SOLUTION INTRAVENOUS at 11:49

## 2023-12-19 RX ADMIN — TRANEXAMIC ACID 1950 MG: 650 TABLET ORAL at 11:01

## 2023-12-19 RX ADMIN — ACETAMINOPHEN 975 MG: 325 TABLET, FILM COATED ORAL at 21:15

## 2023-12-19 RX ADMIN — Medication 5 MG: at 14:59

## 2023-12-19 RX ADMIN — Medication 5 MG: at 14:09

## 2023-12-19 RX ADMIN — BUPIVACAINE HYDROCHLORIDE 15 ML: 5 INJECTION, SOLUTION EPIDURAL; INTRACAUDAL at 11:41

## 2023-12-19 RX ADMIN — LIDOCAINE HYDROCHLORIDE 50 MG: 20 INJECTION, SOLUTION INFILTRATION; PERINEURAL at 13:19

## 2023-12-19 RX ADMIN — LISINOPRIL 20 MG: 20 TABLET ORAL at 20:37

## 2023-12-19 RX ADMIN — Medication 5 MG: at 13:50

## 2023-12-19 ASSESSMENT — ACTIVITIES OF DAILY LIVING (ADL)
ADLS_ACUITY_SCORE: 24
ADLS_ACUITY_SCORE: 26
ADLS_ACUITY_SCORE: 24
ADLS_ACUITY_SCORE: 26
ADLS_ACUITY_SCORE: 24
ADLS_ACUITY_SCORE: 35
ADLS_ACUITY_SCORE: 24

## 2023-12-19 ASSESSMENT — ENCOUNTER SYMPTOMS
DYSRHYTHMIAS: 0
SEIZURES: 0

## 2023-12-19 ASSESSMENT — LIFESTYLE VARIABLES: TOBACCO_USE: 0

## 2023-12-19 NOTE — ANESTHESIA POSTPROCEDURE EVALUATION
Patient: Juan Alberto Schultz    Procedure: Procedure(s):  LEFT TOTAL KNEE ARTHROPLASTY       Anesthesia Type:  Spinal    Note:  Disposition: Admission   Postop Pain Control: Uneventful            Sign Out: Well controlled pain   PONV: No   Neuro/Psych: Uneventful            Sign Out: spinal wearing off.   Airway/Respiratory: Uneventful            Sign Out: Acceptable/Baseline resp. status   CV/Hemodynamics: Uneventful            Sign Out: Acceptable CV status   Other NRE: NONE   DID A NON-ROUTINE EVENT OCCUR? No           Last vitals:  Vitals Value Taken Time   /83 12/19/23 1615   Temp 36.4  C (97.6  F) 12/19/23 1545   Pulse 85 12/19/23 1628   Resp 15 12/19/23 1629   SpO2 96 % 12/19/23 1628   Vitals shown include unfiled device data.    Electronically Signed By: Delmer Douglas MD  December 19, 2023  4:29 PM

## 2023-12-19 NOTE — ANESTHESIA PREPROCEDURE EVALUATION
Anesthesia Pre-Procedure Evaluation    Patient: Juan Alberto Schultz   MRN: [id number] : 1945        Procedure : Procedure(s):  LEFT TOTAL KNEE ARTHROPLASTY          Past Medical History:   Diagnosis Date    Allergic state     Migraines     Umbilical hernia       Past Surgical History:   Procedure Laterality Date    LAPAROSCOPIC CHOLECYSTECTOMY N/A 10/4/2019    Procedure: CHOLECYSTECTOMY, LAPAROSCOPIC;  Surgeon: Juan Alberto Ramey MD;  Location:  OR    ORTHOPEDIC SURGERY        Allergies   Allergen Reactions    Antihistamines, Chlorpheniramine-Type [Antihistamines, Chlorpheniramine-Type]     Latex     Valium [Diazepam]       Social History     Tobacco Use    Smoking status: Never    Smokeless tobacco: Never   Substance Use Topics    Alcohol use: No      Wt Readings from Last 1 Encounters:   21 87.5 kg (193 lb)        Anesthesia Evaluation    Type: General.        ROS/MED HX  ENT/Pulmonary: Comment: Laryngeal dystonia   (-) tobacco use, asthma and sleep apnea   Neurologic:     (+)    no peripheral neuropathy          TIA,               (-) no seizures and no CVA   Cardiovascular:     (+)  hypertension- -   -  - -                                   (-) CAD and arrhythmias   METS/Exercise Tolerance:     Hematologic:       Musculoskeletal:       GI/Hepatic:     (+)   esophageal disease,              (-) GERD   Renal/Genitourinary:     (+) renal disease, type: CRI,            Endo:    (-) Type II DM and thyroid disease   Psychiatric/Substance Use:     (+)  1       Infectious Disease:    (-) Recent Fever   Malignancy:   (+) Malignancy, History of Prostate.    Other:            Physical Exam    Airway  airway exam normal      Mallampati: II   TM distance: > 3 FB   Neck ROM: full   Mouth opening: > 3 cm    Respiratory Devices and Support         Dental       (+) Minor Abnormalities - some fillings, tiny chips      Cardiovascular   cardiovascular exam normal          Pulmonary   pulmonary exam normal       "          OUTSIDE LABS:  CBC:   Lab Results   Component Value Date    WBC 5.1 01/18/2022    WBC 6.2 09/17/2021    HGB 14.5 01/18/2022    HGB 13.3 09/17/2021    HCT 44.1 01/18/2022    HCT 39.0 (L) 09/17/2021     01/18/2022     09/17/2021     BMP:   Lab Results   Component Value Date     01/18/2022     09/17/2021    POTASSIUM 3.8 01/18/2022    POTASSIUM 3.7 09/17/2021    CHLORIDE 103 01/18/2022    CHLORIDE 106 09/17/2021    CO2 26 01/18/2022    CO2 26 09/17/2021    BUN 27 01/18/2022    BUN 31 (H) 09/17/2021    CR 0.97 01/18/2022    CR 0.89 09/17/2021     (H) 01/18/2022     (H) 09/17/2021     COAGS:   Lab Results   Component Value Date    PTT 33 10/03/2018    INR 1.01 10/03/2018     POC: No results found for: \"BGM\", \"HCG\", \"HCGS\"  HEPATIC:   Lab Results   Component Value Date    ALBUMIN 3.4 01/18/2022    PROTTOTAL 7.1 01/18/2022    ALT 38 01/18/2022    AST 33 01/18/2022    ALKPHOS 84 01/18/2022    BILITOTAL 0.6 01/18/2022     OTHER:   Lab Results   Component Value Date    LACT 1.2 07/19/2020    ELIJAH 9.2 01/18/2022    PHOS 3.1 07/31/2019    MAG 2.2 07/31/2019    LIPASE 135 10/04/2019    CRP 4.8 09/17/2021       Anesthesia Plan    ASA Status:  2    NPO Status:  NPO Appropriate    Anesthesia Type: Spinal.              Consents    Anesthesia Plan(s) and associated risks, benefits, and realistic alternatives discussed. Questions answered and patient/representative(s) expressed understanding.     - Discussed:     - Discussed with:  Patient            Postoperative Care    Pain management: IV analgesics, Oral pain medications, Peripheral nerve block (Single Shot).   PONV prophylaxis: Ondansetron (or other 5HT-3)     Comments:    Other Comments: No versed           Delmer Douglas MD    I have reviewed the pertinent notes and labs in the chart from the past 30 days and (re)examined the patient.  Any updates or changes from those notes are reflected in this note.                  "

## 2023-12-19 NOTE — ANESTHESIA PROCEDURE NOTES
Adductor canal Procedure Note    Pre-Procedure   Staff -        Anesthesiologist:  Delmer Douglas MD       Performed By: anesthesiologist       Location: pre-op       Procedure Start/Stop Times: 12/19/2023 11:41 AM and 12/19/2023 11:44 AM       Pre-Anesthestic Checklist: patient identified, IV checked, site marked, risks and benefits discussed, informed consent, monitors and equipment checked, pre-op evaluation, at physician/surgeon's request and post-op pain management  Timeout:       Correct Patient: Yes        Correct Procedure: Yes        Correct Site: Yes        Correct Position: Yes        Correct Laterality: Yes   Procedure Documentation  Procedure: Adductor canal       Laterality: left       Patient Position: supine       Patient Prep/Sterile Barriers: sterile gloves, mask       Skin prep: Chloraprep       Local skin infiltrated with mL of 1% lidocaine.        Needle Type: insulated and short bevel       Needle Gauge: 21.        Needle Length (millimeters): 100        Ultrasound guided       1. Ultrasound was used to identify targeted nerve, plexus, vascular marker, or fascial plane and place a needle adjacent to it in real-time.       2. Ultrasound was used to visualize the spread of anesthetic in close proximity to the above referenced structure.       3. A permanent image is entered into the patient's record.       4. The visualized anatomic structures appeared normal.       5. There were no apparent abnormal pathologic findings.    Assessment/Narrative         The placement was negative for: blood aspirated, painful injection and site bleeding       Paresthesias: No.       Bolus given via needle..        Secured via.        Insertion/Infusion Method: Single Shot       Complications: none       Injection made incrementally with aspirations every 5 mL.    Medication(s) Administered   Bupivacaine 0.5% w/ 1:400K Epi (Injection) - Injection   15 mL - 12/19/2023 11:41:00 AM  Medication  "Administration Time: 12/19/2023 11:41 AM     Comments:  Pt tolerated well.    No complications.      The surgeon has given a verbal order transferring care of this patient to me for the performance of a regional analgesia block for post-op pain control. It is requested of me because I am uniquely trained and qualified to perform this block and the surgeon is neither trained nor qualified to perform this procedure.    Delmer Douglas MD   11:59 AM      FOR [facility] ([facility] [place]) ONLY:   Pain Team Contact information: please page the Pain Team Via [employer]. Search \"Pain\". During daytime hours, please page the attending first. At night please page the resident first.      "

## 2023-12-19 NOTE — ANESTHESIA CARE TRANSFER NOTE
Patient: Juan Alberto Schultz    Procedure: Procedure(s):  LEFT TOTAL KNEE ARTHROPLASTY       Diagnosis: Osteoarthritis of left knee [M17.12]  Diagnosis Additional Information: No value filed.    Anesthesia Type:   Spinal     Note:    Oropharynx: oropharynx clear of all foreign objects and spontaneously breathing  Level of Consciousness: drowsy  Oxygen Supplementation: face mask  Level of Supplemental Oxygen (L/min / FiO2): 6  Independent Airway: airway patency satisfactory and stable  Dentition: dentition unchanged  Vital Signs Stable: post-procedure vital signs reviewed and stable  Report to RN Given: handoff report given  Patient transferred to: PACU    Handoff Report: Identifed the Patient, Identified the Reponsible Provider, Reviewed the pertinent medical history, Discussed the surgical course, Reviewed Intra-OP anesthesia mangement and issues during anesthesia, Set expectations for post-procedure period and Allowed opportunity for questions and acknowledgement of understanding      Vitals:  Vitals Value Taken Time   /77    Temp     Pulse 93 12/19/23 1524   Resp 18 12/19/23 1524   SpO2 93 % 12/19/23 1524   Vitals shown include unfiled device data.    Electronically Signed By: ULYSSES Dodd CRNA  December 19, 2023  3:25 PM

## 2023-12-19 NOTE — TREATMENT PLAN
Interval Provider Note    IM team consulted for co-management after left total knee arthroplasty. Pertinent PTA medications include lisinopril 20 mg daily, hydrochlorothiazide 12.5 mg every evening, and aspirin bid which were continued on admission by orthopedics team. I reviewed admission CBC and BMP which were unremarkable. Patient is hemodynamically stable.     Plan   - No changes to current treatment plan  - Formal IM consult note to come tomorrow    Brent Mendez DO

## 2023-12-19 NOTE — OP NOTE
Procedure Date: December 19, 2023    PATIENT: Juan Alberto Schultz  1945     PREOPERATIVE DIAGNOSIS:  Left knee advanced osteoarthritis.     POSTOPERATIVE DIAGNOSIS:  Left knee advanced osteoarthritis.     PROCEDURE:  Left total knee arthroplasty.     SURGEON:  Reji Valera MD     ASSISTANT:  Adrienne Petersen PA-C     COMPLICATIONS:  None.     ESTIMATED BLOOD LOSS:  30 mL.     IMPLANTS:  [employer] system:  1.  Size 7, left, PS femoral component, cobalt chrome  2.  Size 5, left standard tibial baseplate.  3.  Size 13, 5/6, high flex PS polyethylene.  4.  Size 35 concentric polyethylene patella.     DESCRIPTION OF PROCEDURE:  The patient was brought to the operating room December 19, 2023 for left total knee arthroplasty.  Patient has a history of advanced osteoarthritis of the knee refractory to conservative measures. Patient was seen on the day of surgery in the preoperative holding area.  Left knee marked as the correct operative site.  Received a dose of IV antibiotic less than 30 minutes prior to surgical incision.  Post-surgical antibiotic and DVT prophylaxis are indicated and will be prescribed.  Skilled assistant was used for positioning, draping, retraction, closure, dressing application. Regional block administered by anesthesia.     The patient was brought to the operating room and placed under a spinal anesthesia.  The operative lower extremity was prepped and draped in the standard sterile fashion.  Preoperative timeout was taken.  Thigh tourniquet inflated to 250 mmHg.  Anterior longitudinal surgical incision was made.  Medial parapatellar arthrotomy was performed.  Advanced tricompartmental osteoarthritis confirmed.  Marginal osteophytes debrided.  Synovectomy performed.  Patella was cut to a thickness of 14 mm and sized to a 35.  Femur prepared with an intramedullary guide roverto and a 4-degree valgus cutting guide.  I took an extra 4 mm of distal femur and sized the cut distal femur to a 7.   Prepared in the appropriate rotational alignment.    The tibia was prepared using an extramedullary cutting guide, taking 9 mm of bone and cartilage off the lateral side.  Significant posteromedial erosion was noted. The cut proximal tibia was sized to a 5 and was punched in the appropriate rotational alignment centered on the medial third of the tibial tubercle.  Tibial component positioning was deliberately downsized and lateralized to avoid the posteromedial bone erosion, and allow for posteromedial soft tissue release and osteophyte debridement.  Posterior osteophytes and menisci were removed.  Posterior capsule injected with a cocktail of Toradol, ropivacaine and saline.    With trial components in place and a size 13 trial PS polyethylene, I was pleased with range of motion, stability, and gap balancing.  Trial components were removed.  Jet lavage irrigation performed.  Components listed above were cemented into place using 2 batch of high viscosity Simplex cement without antibiotic.  Once the cement had hardened and all extruded cement removed, I implanted the size 13 PS polyethylene for the 5 baseplate.    Tourniquet deflated.  Hemostasis achieved.  Betadine wash performed.  Jet lavage irrigation performed.  Arthrotomy was closed with interrupted Ethibond sutures over 1 g of vancomycin powder.  Superficial soft tissues irrigated and closed in layers.  A sterile dressing was applied.  The patient was brought to recovery in stable condition.  Needle and lap counts were correct at the end of the case.  There were no known complications.    Additional intraoperative findings of note: None    Special postsurgical patient care factors: None     Reji Valera MD

## 2023-12-19 NOTE — BRIEF OP NOTE
[facility]    Brief Operative Note    Pre-operative diagnosis: Osteoarthritis of left knee [M17.12]  Post-operative diagnosis Same as pre-operative diagnosis    Procedure: LEFT TOTAL KNEE ARTHROPLASTY, Left - Knee    Surgeon: Surgeon(s) and Role:     * Reji Valera MD - Primary     * Adrienne Petersen PA-C - Assisting  Anesthesia: Spinal with Block   Estimated Blood Loss: Less than 50 ml    Drains: None  Specimens: * No specimens in log *  Findings:   None.  Complications: None.  Implants:   Implant Name Type Inv. Item Serial No.  Lot No. LRB No. Used Action   BONE CEMENT RADIOPAQUE SIMPLEX HV FULL DOSE 6194-1-001 - [id number] Cement, Bone BONE CEMENT RADIOPAQUE SIMPLEX HV FULL DOSE 6194-1-001  [employer] [id number] Left 1 Implanted   BONE CEMENT RADIOPAQUE SIMPLEX HV FULL DOSE 6194-1-001 - [id number] Cement, Bone BONE CEMENT RADIOPAQUE SIMPLEX HV FULL DOSE 6194-1-001  [employer] [id number] Left 1 Implanted   IMP BASEPLATE TIBIAL MOISE II SZ 5 LT TI [id number] - [id number] Total Joint Component/Insert IMP BASEPLATE TIBIAL MOISE II SZ 5 LT TI [id number]  [employer] [id number] Left 1 Implanted   IMP COMP FEMORAL S&N LEGION PS NP SZ7 LT [id number] - [id number] Total Joint Component/Insert IMP COMP FEMORAL S&N LEGION PS NP SZ7 LT [id number]  [employer] [id number] Left 1 Implanted   IMP COMP PATELLA MOISE II 9X35MM [id number] - [id number] Total Joint Component/Insert IMP COMP PATELLA MOISE II 9X35MM [id number]  [employer] [id number] Left 1 Implanted   IMP INSERT TIB S&N LGN PS HI FLEX XLPE SZ5-6 13MM [id number] - [id number] Total Joint Component/Insert IMP INSERT TIB S&N LGN PS HI FLEX XLPE SZ5-6 13MM [id number]  [employer] [id number] Left 1 Implanted

## 2023-12-20 ENCOUNTER — APPOINTMENT (OUTPATIENT)
Dept: PHYSICAL THERAPY | Facility: CLINIC | Age: 78
End: 2023-12-20
Attending: ORTHOPAEDIC SURGERY
Payer: MEDICARE

## 2023-12-20 ENCOUNTER — APPOINTMENT (OUTPATIENT)
Dept: OCCUPATIONAL THERAPY | Facility: CLINIC | Age: 78
End: 2023-12-20
Attending: ORTHOPAEDIC SURGERY
Payer: MEDICARE

## 2023-12-20 VITALS
HEIGHT: 69 IN | TEMPERATURE: 97.6 F | SYSTOLIC BLOOD PRESSURE: 113 MMHG | RESPIRATION RATE: 18 BRPM | BODY MASS INDEX: 31.21 KG/M2 | OXYGEN SATURATION: 94 % | WEIGHT: 210.7 LBS | HEART RATE: 94 BPM | DIASTOLIC BLOOD PRESSURE: 70 MMHG

## 2023-12-20 LAB
FASTING STATUS PATIENT QL REPORTED: ABNORMAL
GLUCOSE SERPL-MCNC: 175 MG/DL (ref 70–99)
HGB BLD-MCNC: 12.9 G/DL (ref 13.3–17.7)

## 2023-12-20 PROCEDURE — 85018 HEMOGLOBIN: CPT | Performed by: ORTHOPAEDIC SURGERY

## 2023-12-20 PROCEDURE — 36415 COLL VENOUS BLD VENIPUNCTURE: CPT | Performed by: ORTHOPAEDIC SURGERY

## 2023-12-20 PROCEDURE — 99207 PR NO BILLABLE SERVICE THIS VISIT: CPT | Performed by: INTERNAL MEDICINE

## 2023-12-20 PROCEDURE — 97161 PT EVAL LOW COMPLEX 20 MIN: CPT | Mod: GP | Performed by: PHYSICAL THERAPIST

## 2023-12-20 PROCEDURE — 82947 ASSAY GLUCOSE BLOOD QUANT: CPT | Performed by: ORTHOPAEDIC SURGERY

## 2023-12-20 PROCEDURE — 97116 GAIT TRAINING THERAPY: CPT | Mod: GP | Performed by: PHYSICAL THERAPIST

## 2023-12-20 PROCEDURE — 250N000011 HC RX IP 250 OP 636: Performed by: ORTHOPAEDIC SURGERY

## 2023-12-20 PROCEDURE — 999N000226 HC STATISTIC SLP IP EVAL DEFER

## 2023-12-20 PROCEDURE — 250N000013 HC RX MED GY IP 250 OP 250 PS 637: Performed by: ORTHOPAEDIC SURGERY

## 2023-12-20 PROCEDURE — 97535 SELF CARE MNGMENT TRAINING: CPT | Mod: GO | Performed by: OCCUPATIONAL THERAPIST

## 2023-12-20 PROCEDURE — 97110 THERAPEUTIC EXERCISES: CPT | Mod: GP | Performed by: PHYSICAL THERAPIST

## 2023-12-20 PROCEDURE — 97530 THERAPEUTIC ACTIVITIES: CPT | Mod: GP | Performed by: PHYSICAL THERAPIST

## 2023-12-20 PROCEDURE — 97165 OT EVAL LOW COMPLEX 30 MIN: CPT | Mod: GO | Performed by: OCCUPATIONAL THERAPIST

## 2023-12-20 RX ORDER — HYDROMORPHONE HYDROCHLORIDE 2 MG/1
2-4 TABLET ORAL EVERY 4 HOURS PRN
Qty: 31 TABLET | Refills: 0 | Status: SHIPPED | OUTPATIENT
Start: 2023-12-20

## 2023-12-20 RX ORDER — ASPIRIN 81 MG/1
81 TABLET ORAL EVERY EVENING
COMMUNITY
Start: 2023-12-20

## 2023-12-20 RX ADMIN — ASPIRIN 81 MG: 81 TABLET, COATED ORAL at 09:38

## 2023-12-20 RX ADMIN — CEFAZOLIN SODIUM 2 G: 2 INJECTION, SOLUTION INTRAVENOUS at 05:03

## 2023-12-20 RX ADMIN — DOCUSATE SODIUM 50 MG AND SENNOSIDES 8.6 MG 1 TABLET: 8.6; 5 TABLET, FILM COATED ORAL at 09:38

## 2023-12-20 RX ADMIN — HYDROMORPHONE HYDROCHLORIDE 2 MG: 2 TABLET ORAL at 06:25

## 2023-12-20 RX ADMIN — POLYETHYLENE GLYCOL 3350 17 G: 17 POWDER, FOR SOLUTION ORAL at 09:38

## 2023-12-20 RX ADMIN — ACETAMINOPHEN 975 MG: 325 TABLET, FILM COATED ORAL at 05:03

## 2023-12-20 ASSESSMENT — ACTIVITIES OF DAILY LIVING (ADL)
ADLS_ACUITY_SCORE: 26

## 2023-12-20 NOTE — PROGRESS NOTES
"   12/20/23 0900   Appointment Info   Signing Clinician's Name / Credentials (PT) Delmer Agrawal DPT       Present no   Living Environment   People in Home spouse   Current Living Arrangements house   Home Accessibility stairs to enter home   Number of Stairs, Main Entrance 3   Stair Railings, Main Entrance railing on left side (ascending)   Transportation Anticipated family or friend will provide   Living Environment Comments Pt lives in a house with his spouse. Stairs to enter. Pt reports his spouse will pick him up upon discharge and provide assist as needed.   Self-Care   Usual Activity Tolerance good   Current Activity Tolerance moderate   Regular Exercise No   Equipment Currently Used at Home cane, straight;raised toilet seat;shower chair;walker, rolling   Fall history within last six months no   Activity/Exercise/Self-Care Comment Pt reports being IND at baseline with all ADLs. Pt ambulates w/ an SEC at baseline but has a FWW if needed. Pt drives.   General Information   Onset of Illness/Injury or Date of Surgery 12/20/23   Referring Physician Reji Valera MD   Patient/Family Therapy Goals Statement (PT) \"To get better\"   Pertinent History of Current Problem (include personal factors and/or comorbidities that impact the POC) s/p L TKA POD#1   Existing Precautions/Restrictions fall   Weight-Bearing Status - LLE weight-bearing as tolerated   Weight-Bearing Status - RLE full weight-bearing   Cognition   Orientation Status (Cognition) oriented x 4   Pain Assessment   Patient Currently in Pain No   Integumentary/Edema   Integumentary/Edema Comments Incision on L knee with dressing intact   Posture    Posture Protracted shoulders;Forward head position   Range of Motion (ROM)   Range of Motion ROM is WFL;ROM deficits secondary to surgical procedure;ROM deficits secondary to pain;ROM deficits secondary to swelling;ROM deficits secondary to weakness   ROM Comment L Knee Flex/Ext: 63/3 " degrees   Strength (Manual Muscle Testing)   Strength (Manual Muscle Testing) Able to perform R SLR;Able to perform L SLR   Bed Mobility   Comment, (Bed Mobility) Supine>sit w/ SBA   Transfers   Comment, (Transfers) Sit>stand w/ FWW and SBA   Gait/Stairs (Locomotion)   Reading Level (Gait) supervision   Assistive Device (Gait) walker, front-wheeled   Distance in Feet (Gait) 5'   Balance   Balance Comments Adequate static sitting balance; pt ambulates w/ a FWW for added stability and support.   Sensory Examination   Sensory Perception patient reports no sensory changes   Clinical Impression   Criteria for Skilled Therapeutic Intervention Yes, treatment indicated   PT Diagnosis (PT) Impaired gait   Influenced by the following impairments Decreased activity tolerance; decreased balance; decreased strength   Functional limitations due to impairments Impaired functional mobility   Clinical Presentation (PT Evaluation Complexity) stable   Clinical Presentation Rationale Clinical judgement   Clinical Decision Making (Complexity) low complexity   Planned Therapy Interventions (PT) balance training;bed mobility training;gait training;home exercise program;patient/family education;stair training;strengthening;transfer training;progressive activity/exercise   Risk & Benefits of therapy have been explained evaluation/treatment results reviewed;care plan/treatment goals reviewed;risks/benefits reviewed;current/potential barriers reviewed;participants voiced agreement with care plan;participants included;patient   PT Total Evaluation Time   PT Eval, Low Complexity Minutes (79159) 10   Physical Therapy Goals   PT Frequency 2x/day   PT Predicted Duration/Target Date for Goal Attainment 12/25/23   PT Goals Bed Mobility;Transfers;Gait;Stairs   PT: Bed Mobility Supervision/stand-by assist;Supine to/from sit;Goal Met   PT: Transfers Supervision/stand-by assist;Sit to/from stand;Assistive device;Goal Met   PT: Gait  Supervision/stand-by assist;Assistive device;100 feet;Goal Met   PT: Stairs Minimal assist;3 stairs;Assistive device;Rail on left;Goal Met   Interventions   Interventions Quick Adds Gait Training;Therapeutic Activity;Therapeutic Procedure   Therapeutic Procedure/Exercise   Ther. Procedure: strength, endurance, ROM, flexibillity Minutes (10097) 8   Symptoms Noted During/After Treatment increased pain   Treatment Detail/Skilled Intervention Pt completed the following supine LE exercises x 5 reps to improve strength and functional mobility: ankle pumps, quad sets, glute sets, heel slides and SLRs. Verbal and tactile cues for technique. Pt tolerated well   Therapeutic Activity   Therapeutic Activities: dynamic activities to improve functional performance Minutes (30171) 30   Symptoms Noted During/After Treatment Fatigue;Increased pain   Treatment Detail/Skilled Intervention Greeted pt supine in bed, agreed to PT. VSS on RA throughout session. Pt with many questions, extensive time spent educating pt on WBAT status, walking program, AD use, OP PT, pillow positioning, sleeping positions, and elevation/icing regiment. Pt performed supine>sit w/ SBA. Once in sitting, pt able to scoot self to EOB and sit unsupported without LOB. Pt asking to have pants donned, requiring min A. Pt performed sit>stand x 3 w/ FWW and SBA, verbal cues for hand placement. After ambulation, pt returned to supine in bed w/ SBA, demonstrating ability to safely lift BLEs back into bed and reposition self. Pt ended session supine in bed, with all needs met and call light within reach.   Gait Training   Gait Training Minutes (25041) 20   Symptoms Noted During/After Treatment (Gait Training) fatigue;increased pain   Treatment Detail/Skilled Intervention Pt ambulated w/ FWW and SBA. Pt ambulated with decreased gait speed, downward gaze, decreased step length and heavy reliance on FWW. Verbal cues for upright gaze and posture, to increase step length, to  reduce reliance on FWW, and pacing. Good carryover with cues. Pt negotiated 4 steps continuously using L rail and quad cane w/ CGA. PT provided visual demonstration for safe stair negotiation. Pt particular with stairs, requiring placing quad cane between legs to negotiate stairs despite cues to place quad cane on his R side. Pt negotiated stairs backwards, demonstrating safe technique. No LOB noted.   Distance in Feet 100' x 2   PT Discharge Planning   PT Plan DC   PT Discharge Recommendation (DC Rec)   (Defer to ortho)   PT Rationale for DC Rec Pt is below baseline but is moving well. Pt able to perform all transfers, ambulation and stairs w/ SBA using a FWW. Anticipate at time of discharge pt will be able to safely return home with assist from spouse.   PT Brief overview of current status SBA w/ FWW   Total Session Time   Timed Code Treatment Minutes 58   Total Session Time (sum of timed and untimed services) 68

## 2023-12-20 NOTE — PROGRESS NOTES
A&OX4.  Up with stand by assist and walker.  Voiding adequately.  Tolerating regular diet.  Discharge packet and medications reviewed and sent home with the patient.  Discharge around 1330

## 2023-12-20 NOTE — PLAN OF CARE
SLP: Orders received for dysphagia, chart reviewed and discussion held with pt and wife in room. PMHx of reflux esophagitis, esophageal spasms, vocal cord dysfunction, irritable larynx syndrome, dysphonia -- pt mentioned chronic dysphagia to hospitalist yesterday which prompted IP SLP consult. Today, pt/wife showed excellent awareness of PMHx and IND management of swallow function. They both decline need for IP SLP evaluation, are managing current diet well and aware for OP follow up (pt already has care providers) if concerns arise. SLP to complete orders.

## 2023-12-20 NOTE — PROGRESS NOTES
12/20/23 1006   Appointment Info   Signing Clinician's Name / Credentials (OT) Abdon Matthews EdD, OTR/L   Living Environment   People in Home spouse   Current Living Arrangements house  ([facility] home with basement)   Home Accessibility stairs to enter home;stairs within home   Number of Stairs, Main Entrance 3   Stair Railings, Main Entrance railing on left side (ascending)   Number of Stairs, Within Home, Primary greater than 10 stairs  (to basement, pt does not need to use)   Transportation Anticipated family or friend will provide   Living Environment Comments Pt lives in a house with his spouse.  Spouse able to help with daily cares   Self-Care   Usual Activity Tolerance good   Current Activity Tolerance moderate   Regular Exercise No   Equipment Currently Used at Home cane, straight;raised toilet seat;shower chair;walker, rolling;dressing device   Fall history within last six months no   Activity/Exercise/Self-Care Comment Pt reports being independent at baseline. Spouse present during session,   General Information   Onset of Illness/Injury or Date of Surgery 12/19/23   Referring Physician Reji Valera   Patient/Family Therapy Goal Statement (OT) return home, go to OP PT   Additional Occupational Profile Info/Pertinent History of Current Problem Pt is a 78 year old male admitted for a left TKA.  PMH includes lateral dystonia, prostate CA, DM   Performance Patterns (Routines, Roles, Habits) Pt reports being independent in basic ADLS.  Is quite long winded, asking many questions and having long explanations when answering questions   Existing Precautions/Restrictions fall   General Observations and Info Pt in bed, spouse present.  Breakfast tray in room.   Cognitive Status Examination   Orientation Status orientation to person, place and time   Cognitive Status Comments pt has long winded answers, asks many questions.   Visual Perception   Visual Impairment/Limitations WNL   Range of Motion Comprehensive    General Range of Motion no range of motion deficits identified   Strength Comprehensive (MMT)   General Manual Muscle Testing (MMT) Assessment no strength deficits identified   Coordination   Upper Extremity Coordination No deficits were identified   Bed Mobility   Bed Mobility supine-sit;sit-supine   Supine-Sit [place] (Bed Mobility) independent   Sit-Supine [place] (Bed Mobility) independent   Assistive Device (Bed Mobility) bed rails   Comment (Bed Mobility) pt needing some cues to focus on the transfer itself rather than the position of the bed.   Transfers   Transfers sit-stand transfer   Sit-Stand Transfer   Sit-Stand [place] (Transfers) supervision;contact guard   Assistive Device (Sit-Stand Transfers) walker, front-wheeled   Clinical Impression   Criteria for Skilled Therapeutic Interventions Met (OT) Yes, treatment indicated   OT Diagnosis decreased independence in ADLS   OT Problem List-Impairments impacting ADL problems related to;activity tolerance impaired;range of motion (ROM)   Assessment of Occupational Performance 1-3 Performance Deficits   Identified Performance Deficits decreased endurance for bathing, functional mobility   Planned Therapy Interventions (OT) ADL retraining;home program guidelines   Clinical Decision Making Complexity (OT) problem focused assessment/low complexity   Risk & Benefits of therapy have been explained evaluation/treatment results reviewed;care plan/treatment goals reviewed;patient;spouse/significant other   OT Total Evaluation Time   OT Eval, Low Complexity Minutes (35449) 15   OT Goals   Therapy Frequency (OT) One time eval and treatment   OT Predicted Duration/Target Date for Goal Attainment 12/20/23   OT Goals Lower Body Dressing;Toilet Transfer/Toileting;OT Goal 1   OT: Lower Body Dressing Modified independent;including set-up/clothing retrieval   OT: Toilet Transfer/Toileting Modified independent;toilet transfer;cleaning and garment management;using  adaptive equipment   OT: Goal 1 Pt will be able to describe appropriate method for tub/car transfers at home   Interventions   Interventions Quick Adds Self-Care/Home Management   Self-Care/Home Management   Self-Care/Home Mgmt/ADL, Compensatory, Meal Prep Minutes (01978) 25   Symptoms Noted During/After Treatment (Meal Preparation/Planning Training) none   Treatment Detail/Skilled Intervention OT: Pt in bed, spouse in room.  Pt has many questions and can ramble, refocusing needed to have pt stay on task.  Came to EOB with SBA and verbal cues.  Able to complete LE dressing from EOB without AE.  Stood with verbal cues and CGA.  Used walker to get to bathroom to complete a toilet transfer with CGA.  Reviewed transfers to tub at home and car.  Pt has used the method we teach for quite some time, both pt and spouse feel that he knows how to do that.  Pt returned to bed to eat breakfast with setup.  No further IP OT needs at this time.   OT Discharge Planning   OT Plan discharge   OT Discharge Recommendation (DC Rec) home;home with assist   OT Rationale for DC Rec Pt demonstrating good overall movement for bed mobility and TB dressing.  Has tub shower at home with multiple options fof AE.  House is accessible and spouse is supportive.  Anticipate pt will continue to improve and be able to return home with assist of spouse and AE   OT Brief overview of current status Able to complete TB dresssing without AE, good mobility to bathroom using walker with good knowledge of techniques for car and tub transfers.   Total Session Time   Timed Code Treatment Minutes 25   Total Session Time (sum of timed and untimed services) 40

## 2023-12-20 NOTE — PLAN OF CARE
Goal Outcome Evaluation:         Patient vital signs are at baseline: Yes  Patient able to ambulate as they were prior to admission or with assist devices provided by therapies during their stay:  No,  Reason:  stood at bedside  Patient MUST void prior to discharge:  Yes  Patient able to tolerate oral intake:  Yes  Pain has adequate pain control using Oral analgesics:  Yes  Does patient have an identified :  Yes  Has goal D/C date and time been discussed with patient:  Yes    Pt A&Ox4. Dressing CDI. CMS intact

## 2023-12-20 NOTE — PROGRESS NOTES
Patient vital signs are at baseline: Yes  Patient able to ambulate as they were prior to admission or with assist devices provided by therapies during their stay:  No,  Reason:  not yet, got to the unit @1700  Patient MUST void prior to discharge:  No,  Reason:  due to void  Patient able to tolerate oral intake:  Yes   Pain has adequate pain control using Oral analgesics:  Yes  Does patient have an identified :  Yes  Has goal D/C date and time been discussed with patient:  Yes

## 2023-12-20 NOTE — PROGRESS NOTES
"ORTHOPEDIC LOWER EXTREMITY PROGRESS NOTE    POD#1  Patient is a 78 year old male who underwent Procedure(s):  LEFT TOTAL KNEE ARTHROPLASTY on 2023. Pain is well controlled. Tolerating medication well, no nausea or vomiting.  He is pleasantly surprised with how well he is feeling.  Voiding well.  Questions were answered, discharge instructions reviewed.    Vitals:   Blood pressure 113/70, pulse 94, temperature 97.6  F (36.4  C), temperature source Oral, resp. rate 18, height 1.753 m (5' 9\"), weight 95.6 kg (210 lb 11.2 oz), SpO2 94%.  Temp (24hrs), Av.6  F (36.4  C), Min:97.3  F (36.3  C), Max:97.9  F (36.6  C)      Drains: None    EXAM   The patient is awake and alert.  Calves are soft and non-tender.   Sensation is intact.  Dorsiflexion and plantar flexion is intact.  Foot warm with nl cap refill.  The incision is covered.     Labs:   Recent Labs   Lab Test 23  0729 23  1057 22  1703 07/27/19  2022 10/03/18  1404   HGB 12.9* 14.8 14.5   < > 15.5   INR  --   --   --   --  1.01    < > = values in this interval not displayed.       ASSESSMENT  S/p L TKA   PLAN  1. DVT prophylaxis: aspirin  2. Weight Bearing: WBAT (Weight bearing as tolerated).  3. Anticipated discharge date today . Discharge to Home with Outpatient Treatment.  4. Cont Pain Control Tylenol and Dilaudid    Elham Puckett PA-C  [facility]        "

## 2023-12-20 NOTE — PROGRESS NOTES
Hospitalist Consult follow-up  12/20/2023    Post op course from elective left TKA uneventful. Vital signs stable. Hemoglobin 12.9. Completed therapy assessments.     Medications reconciled for discharge later today.     Denice Talley MD

## 2024-06-02 ENCOUNTER — HEALTH MAINTENANCE LETTER (OUTPATIENT)
Age: 79
End: 2024-06-02

## 2025-06-05 ENCOUNTER — APPOINTMENT (OUTPATIENT)
Dept: GENERAL RADIOLOGY | Facility: CLINIC | Age: 80
End: 2025-06-05
Attending: EMERGENCY MEDICINE
Payer: MEDICARE

## 2025-06-05 ENCOUNTER — HOSPITAL ENCOUNTER (EMERGENCY)
Facility: CLINIC | Age: 80
Discharge: HOME | End: 2025-06-05
Attending: EMERGENCY MEDICINE | Admitting: EMERGENCY MEDICINE
Payer: MEDICARE

## 2025-06-05 ENCOUNTER — APPOINTMENT (OUTPATIENT)
Dept: GENERAL RADIOLOGY | Facility: CLINIC | Age: 80
End: 2025-06-05
Attending: STUDENT IN AN ORGANIZED HEALTH CARE EDUCATION/TRAINING PROGRAM
Payer: MEDICARE

## 2025-06-05 VITALS
TEMPERATURE: 98.2 F | DIASTOLIC BLOOD PRESSURE: 72 MMHG | OXYGEN SATURATION: 97 % | RESPIRATION RATE: 18 BRPM | HEART RATE: 77 BPM | SYSTOLIC BLOOD PRESSURE: 110 MMHG

## 2025-06-05 DIAGNOSIS — S46.001A INJURY OF RIGHT ROTATOR CUFF, INITIAL ENCOUNTER: ICD-10-CM

## 2025-06-05 DIAGNOSIS — S50.01XA CONTUSION OF RIGHT ELBOW, INITIAL ENCOUNTER: ICD-10-CM

## 2025-06-05 DIAGNOSIS — S40.011A CONTUSION OF RIGHT SHOULDER, INITIAL ENCOUNTER: ICD-10-CM

## 2025-06-05 PROCEDURE — 250N000013 HC RX MED GY IP 250 OP 250 PS 637: Performed by: EMERGENCY MEDICINE

## 2025-06-05 PROCEDURE — 73070 X-RAY EXAM OF ELBOW: CPT | Mod: RT

## 2025-06-05 PROCEDURE — 99283 EMERGENCY DEPT VISIT LOW MDM: CPT

## 2025-06-05 PROCEDURE — 73030 X-RAY EXAM OF SHOULDER: CPT | Mod: RT

## 2025-06-05 RX ORDER — IBUPROFEN 600 MG/1
600 TABLET, FILM COATED ORAL ONCE
Status: COMPLETED | OUTPATIENT
Start: 2025-06-05 | End: 2025-06-05

## 2025-06-05 RX ADMIN — IBUPROFEN 600 MG: 600 TABLET ORAL at 15:06

## 2025-06-05 ASSESSMENT — ACTIVITIES OF DAILY LIVING (ADL)
ADLS_ACUITY_SCORE: 51

## 2025-06-05 NOTE — ED TRIAGE NOTES
Pt reports losing his balance while holding something in his hands and fell straight backwards, landing on his right shoulder, now having significant shoulder pain.

## 2025-06-05 NOTE — ED NOTES
Patient describes uneven pavement causing fall approx 1 hour pta.  Only injury is right shoulder.  Denies unusual paresthesias (underlying carpal tunnel syndrome).  Did have episode dizziness immediately after fall, but denies hitting head.  Denies current lightheadedness, dizziness.

## 2025-06-05 NOTE — ED PROVIDER NOTES
Emergency Department Note      History of Present Illness     Chief Complaint   Fall      HPI   Juan Alberto Schultz is a 80 year old right-hand-dominant male with a history of hypertension and hyperlipidemia presenting to the ED for evaluation after a fall. The patient reports that he was trying to unload a lawn chair out of the back of his SUV today when he stepped off the curb and fell sideways onto his right shoulder. He did not hit his head. Since the fall, the patient has been experiencing significant pain to the right shoulder and upper arm with movement of the shoulder and elbow. He felt mildly light-headed after the fall as well, but that has since resolved. No numbness or tingling in the fingers, headache, or neck pain. Of note, the patient has a prior rotator cuff injury.  He also denies any chest pain or shortness of breath.    Independent Historian   None    Review of External Notes   I reviewed the patient's MIIC, tetanus is up to date.     Past Medical History     Medical History and Problem List   Allergic state  Migraines  Umbilical hernia   Hypertension  Hyperlipidemia  DONNA    Medications   Aspirin 81 mg  Lipitor  Hydrodiuril   Zestril   Senokot     Surgical History   Knee arthroplasty  Cholecystectomy     Physical Exam     Patient Vitals for the past 24 hrs:   BP Temp Temp src Pulse Resp SpO2   06/05/25 1207 110/72 98.2  F (36.8  C) Temporal 77 18 97 %     Physical Exam  Nursing note and vitals reviewed.  Constitutional:  Oriented to person, place, and time. Cooperative.   HENT:   Nose:    Nose normal.   Mouth/Throat:   Mucous membranes are normal.   Eyes:    Conjunctivae normal and EOM are normal.      Pupils are equal, round, and reactive to light.   Neck:    Trachea normal.   Cardiovascular:  Normal rate, regular rhythm, normal heart sounds and normal pulses. No murmur heard.  Pulmonary/Chest:  Effort normal and breath sounds normal.   Abdominal:   Soft. Normal appearance and bowel sounds are normal.       There is no tenderness.      There is no rebound and no CVA tenderness.   Musculoskeletal:  The right shoulder is normal in appearance without any significant contusion present.  There is some tenderness to palpation in the right shoulder and with attempts at range of motion.  Right elbow is normal in appearance, but there is some tenderness to palpation over the elbow but not with range of motion to the right elbow.  Extremities otherwise atraumatic x 4.   Neurological:   Alert and oriented to person, place, and time. Normal strength.      No cranial nerve deficit or sensory deficit. GCS eye subscore is 4. GCS verbal subscore is 5. GCS motor subscore is 6.   Skin:    Skin is intact. No rash noted.   Psychiatric:   Normal mood and affect.     Diagnostics     Lab Results   Labs Ordered and Resulted from Time of ED Arrival to Time of ED Departure - No data to display    Imaging   XR Elbow Right 2 Views   Final Result   IMPRESSION: No acute displaced fracture or subluxation. No right elbow joint effusion. Trace olecranon enthesophyte. Corticated heterotopic ossification adjacent to the medial humeral epicondyle.      XR Shoulder Right G/E 3 Views   Final Result   IMPRESSION:       1.  Right shoulder negative for fracture or dislocation. 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       2.  Heterogeneous lucency in the proximal humerus involving the metaphysis and anatomic neck. This may be related to the patient's demineralization; there is no definite cortical osteolysis or periostitis to indicate an underlying aggressive lesion. MRI    could better evaluate the bone marrow if clinically indicated.      Report per radiology.     Independent Interpretation   I independently interpreted the patient's right elbow and shoulder X-rays, and I do not appreciate any fractures.    ED Course      Medications Administered   Medications    ibuprofen (ADVIL/MOTRIN) tablet 600 mg (600 mg Oral $Given 6/5/25 1500)       Procedures   Procedures     Discussion of Management   None    ED Course   ED Course as of 06/05/25 1712   Thu Jun 05, 2025   1300 I obtained history and examined the patient as noted above.      Additional Documentation  None    Medical Decision Making / Diagnosis     CMS Diagnoses: None    MIPS   None               [facility]   Juan Alberto Schultz is a 80 year old male who came in for further evaluation of right shoulder pain after falling.  He had x-rays obtained of the right shoulder, which were unremarkable.  Since he had some tenderness to the right elbow as well, I then obtained x-rays of the right elbow.  He initially declined any pain medication.  However later on he agreed to take some ibuprofen.  I suspect that he might have a rotator cuff injury, however his symptoms also could just be from a contusion.  He was placed in a sling, and I recommended he ice the areas that are bothering him.  I also recommended ibuprofen or Tylenol.  He should follow-up with [facility] for further evaluation and management, as I suspect that he may need an MRI as an outpatient.  He should return with any concerns or worsening symptoms.    Disposition   The patient was discharged.     Diagnosis     ICD-10-CM    1. Contusion of right shoulder, initial encounter  S40.011A       2. Contusion of right elbow, initial encounter  S50.01XA       3. Possible Injury of right rotator cuff, initial encounter  S46.001A            Discharge Medications   Discharge Medication List as of 6/5/2025  3:03 PM        Scribe Disclosure:  I, Lily Cardenas, am serving as a scribe at 2:40 PM on 6/5/2025 to document services personally performed by Abhijeet Ruiz MD based on my observations and the provider's statements to me.        Abhijeet Ruiz MD  06/05/25 9186

## 2025-06-15 ENCOUNTER — HEALTH MAINTENANCE LETTER (OUTPATIENT)
Age: 80
End: 2025-06-15

## (undated) DEVICE — SOL NACL 0.9% IRRIG 3000ML BAG 2B7477

## (undated) DEVICE — BLADE SAW SAGITTAL STRK 25X90X1.37MM 4H SYS 6 6125-137-090

## (undated) DEVICE — SUCTION IRR SYSTEM W/O TIP INTERPULSE HANDPIECE 0210-100-000

## (undated) DEVICE — SU VICRYL 2-0 CP-1 27" UND J266H

## (undated) DEVICE — CAST PADDING 6" UNSTERILE 9046

## (undated) DEVICE — GLOVE BIOGEL PI MICRO INDICATOR UNDERGLOVE SZ 8.0 48980

## (undated) DEVICE — WRAP EZY KNEE 1213PP

## (undated) DEVICE — DRAPE SHEET REV FOLD 3/4 9349

## (undated) DEVICE — ENDO POUCH UNIV RETRIEVAL SYSTEM INZII 10MM CD001

## (undated) DEVICE — SUCTION TIP YANKAUER W/O VENT K86

## (undated) DEVICE — SUCTION IRR STRYKERFLOW II W/TIP 250-070-520

## (undated) DEVICE — KIT SURGICAL TURNOVER FVSD-01D

## (undated) DEVICE — GLOVE BIOGEL PI SZ 7.0 40870

## (undated) DEVICE — SOL WATER IRRIG 1000ML BOTTLE 2F7114

## (undated) DEVICE — SOL NACL 0.9% IRRIG 1000ML BOTTLE 2F7124

## (undated) DEVICE — GLOVE BIOGEL PI MICRO INDICATOR UNDERGLOVE SZ 6.5 48965

## (undated) DEVICE — SU VICRYL 4-0 PS-2 18" UND J496H

## (undated) DEVICE — ENDO SCOPE WARMER LF TM500

## (undated) DEVICE — ENDO TROCAR FIRST ENTRY KII FIOS Z-THRD 11X100MM CTF33

## (undated) DEVICE — SOLUTION WOUND CLEANSING 3/4OZ 10% PVP EA-L3011FB-50

## (undated) DEVICE — DRSG XEROFORM 5X9" 8884431605

## (undated) DEVICE — GLOVE GAMMEX DERMAPRENE ULTRA SZ 8.5 LF 8517

## (undated) DEVICE — PACK TOTAL KNEE SOP15TKFSD

## (undated) DEVICE — GLOVE BIOGEL PI SZ 7.5 40875

## (undated) DEVICE — STPL SKIN 35W 6.9MM  PXW35

## (undated) DEVICE — CLIP APPLIER ENDO ROTATING 10MM MED/LG ER320

## (undated) DEVICE — SYR 50ML LL W/O NDL 309653

## (undated) DEVICE — ENDO TROCAR FIRST ENTRY KII FIOS Z-THRD 05X100MM CTF03

## (undated) DEVICE — PREP CHLORAPREP 26ML TINTED ORANGE  260815

## (undated) DEVICE — EVAC SYSTEM CLEAR FLOW SC082500

## (undated) DEVICE — ESU HOLDER LAP INST DISP PURPLE LONG 330MM H-PRO-330

## (undated) DEVICE — BONE CLEANING TIP INTERPULSE  0210-010-000

## (undated) DEVICE — HOOD SURG T7PLUS PEEL AWAY FACE SHIELD STRL LF 0416-801-100

## (undated) DEVICE — NDL SPINAL 18GA 3.5" 405184

## (undated) DEVICE — ESU GROUND PAD UNIVERSAL W/O CORD

## (undated) DEVICE — SU VICRYL 0 CP-1 27" J467H

## (undated) DEVICE — MANIFOLD NEPTUNE 4 PORT 700-20

## (undated) DEVICE — ENDO TROCAR SLEEVE KII Z-THREADED 05X100MM CTS02

## (undated) DEVICE — SU VICRYL 0 UR-6 27" J603H

## (undated) DEVICE — SOL NACL 0.9% INJ 1000ML BAG 2B1324X

## (undated) DEVICE — PACK LAP CHOLE SLC15LCFSD

## (undated) DEVICE — SU ETHIBOND 0 CTX CR  8X18" CX31D

## (undated) DEVICE — DEVICE SUTURE GRASPER TROCAR CLOSURE 14GA PMITCSG

## (undated) DEVICE — DRAPE STERI U 1015

## (undated) DEVICE — LINEN TOWEL PACK X5 5464

## (undated) RX ORDER — EPINEPHRINE 1 MG/ML
INJECTION, SOLUTION INTRAMUSCULAR; SUBCUTANEOUS
Status: DISPENSED
Start: 2019-10-04

## (undated) RX ORDER — TRANEXAMIC ACID 650 MG/1
TABLET ORAL
Status: DISPENSED
Start: 2023-12-19

## (undated) RX ORDER — EPHEDRINE SULFATE 50 MG/ML
INJECTION, SOLUTION INTRAMUSCULAR; INTRAVENOUS; SUBCUTANEOUS
Status: DISPENSED
Start: 2023-12-19

## (undated) RX ORDER — LABETALOL HYDROCHLORIDE 5 MG/ML
INJECTION, SOLUTION INTRAVENOUS
Status: DISPENSED
Start: 2019-10-04

## (undated) RX ORDER — PROPOFOL 10 MG/ML
INJECTION, EMULSION INTRAVENOUS
Status: DISPENSED
Start: 2019-10-04

## (undated) RX ORDER — CEFAZOLIN SODIUM/WATER 2 G/20 ML
SYRINGE (ML) INTRAVENOUS
Status: DISPENSED
Start: 2023-12-19

## (undated) RX ORDER — LIDOCAINE HYDROCHLORIDE 10 MG/ML
INJECTION, SOLUTION EPIDURAL; INFILTRATION; INTRACAUDAL; PERINEURAL
Status: DISPENSED
Start: 2019-10-04

## (undated) RX ORDER — BUPIVACAINE HYDROCHLORIDE 5 MG/ML
INJECTION, SOLUTION EPIDURAL; INTRACAUDAL
Status: DISPENSED
Start: 2019-10-04

## (undated) RX ORDER — VANCOMYCIN HYDROCHLORIDE 1 G/20ML
INJECTION, POWDER, LYOPHILIZED, FOR SOLUTION INTRAVENOUS
Status: DISPENSED
Start: 2023-12-19

## (undated) RX ORDER — NEOSTIGMINE METHYLSULFATE 1 MG/ML
VIAL (ML) INJECTION
Status: DISPENSED
Start: 2019-10-04

## (undated) RX ORDER — FENTANYL CITRATE 50 UG/ML
INJECTION, SOLUTION INTRAMUSCULAR; INTRAVENOUS
Status: DISPENSED
Start: 2019-10-04

## (undated) RX ORDER — DEXAMETHASONE SODIUM PHOSPHATE 4 MG/ML
INJECTION, SOLUTION INTRA-ARTICULAR; INTRALESIONAL; INTRAMUSCULAR; INTRAVENOUS; SOFT TISSUE
Status: DISPENSED
Start: 2023-12-19

## (undated) RX ORDER — ONDANSETRON 2 MG/ML
INJECTION INTRAMUSCULAR; INTRAVENOUS
Status: DISPENSED
Start: 2023-12-19

## (undated) RX ORDER — FENTANYL CITRATE 0.05 MG/ML
INJECTION, SOLUTION INTRAMUSCULAR; INTRAVENOUS
Status: DISPENSED
Start: 2023-12-19

## (undated) RX ORDER — LIDOCAINE HYDROCHLORIDE 20 MG/ML
INJECTION, SOLUTION EPIDURAL; INFILTRATION; INTRACAUDAL; PERINEURAL
Status: DISPENSED
Start: 2019-10-04

## (undated) RX ORDER — PROPOFOL 10 MG/ML
INJECTION, EMULSION INTRAVENOUS
Status: DISPENSED
Start: 2023-12-19

## (undated) RX ORDER — GLYCOPYRROLATE 0.2 MG/ML
INJECTION, SOLUTION INTRAMUSCULAR; INTRAVENOUS
Status: DISPENSED
Start: 2019-10-04